# Patient Record
Sex: MALE | Race: WHITE
[De-identification: names, ages, dates, MRNs, and addresses within clinical notes are randomized per-mention and may not be internally consistent; named-entity substitution may affect disease eponyms.]

---

## 2017-04-06 ENCOUNTER — HOSPITAL ENCOUNTER (OUTPATIENT)
Dept: HOSPITAL 75 - ONC | Age: 62
LOS: 90 days | Discharge: HOME | End: 2017-07-05
Attending: INTERNAL MEDICINE
Payer: COMMERCIAL

## 2017-04-06 DIAGNOSIS — Z86.010: ICD-10-CM

## 2017-04-06 DIAGNOSIS — Z85.831: ICD-10-CM

## 2017-04-06 DIAGNOSIS — Z79.899: ICD-10-CM

## 2017-04-06 DIAGNOSIS — Z08: Primary | ICD-10-CM

## 2017-04-06 LAB
ALBUMIN SERPL-MCNC: 4.3 G/DL (ref 3.2–4.5)
ALT SERPL-CCNC: 32 U/L (ref 0–55)
ANION GAP SERPL CALC-SCNC: 8 MMOL/L (ref 5–14)
AST SERPL-CCNC: 23 U/L (ref 5–34)
BASOPHILS # BLD AUTO: 0 10^3/UL (ref 0–0.1)
BASOPHILS NFR BLD AUTO: 0 % (ref 0–10)
BILIRUB SERPL-MCNC: 1.1 MG/DL (ref 0.1–1)
BUN SERPL-MCNC: 11 MG/DL (ref 7–18)
BUN/CREAT SERPL: 11
CALCIUM SERPL-MCNC: 9.1 MG/DL (ref 8.5–10.1)
CHLORIDE SERPL-SCNC: 106 MMOL/L (ref 98–107)
CO2 SERPL-SCNC: 27 MMOL/L (ref 21–32)
CREAT SERPL-MCNC: 1.01 MG/DL (ref 0.6–1.3)
EOSINOPHIL # BLD AUTO: 0.1 10^3/UL (ref 0–0.3)
EOSINOPHIL NFR BLD AUTO: 2 % (ref 0–10)
ERYTHROCYTE [DISTWIDTH] IN BLOOD BY AUTOMATED COUNT: 14.4 % (ref 10–14.5)
GFR SERPLBLD BASED ON 1.73 SQ M-ARVRAT: > 60 ML/MIN
GLUCOSE SERPL-MCNC: 101 MG/DL (ref 70–105)
LYMPHOCYTES # BLD AUTO: 1 X 10^3 (ref 1–4)
LYMPHOCYTES NFR BLD AUTO: 23 % (ref 12–44)
MCH RBC QN AUTO: 31 PG (ref 25–34)
MCHC RBC AUTO-ENTMCNC: 35 G/DL (ref 32–36)
MCV RBC AUTO: 89 FL (ref 80–99)
MONOCYTES # BLD AUTO: 0.4 X 10^3 (ref 0–1)
MONOCYTES NFR BLD AUTO: 9 % (ref 0–12)
NEUTROPHILS # BLD AUTO: 3 X 10^3 (ref 1.8–7.8)
NEUTROPHILS NFR BLD AUTO: 66 % (ref 42–75)
PLATELET # BLD: 162 10^3/UL (ref 130–400)
PMV BLD AUTO: 8.8 FL (ref 7.4–10.4)
POTASSIUM SERPL-SCNC: 4.1 MMOL/L (ref 3.6–5)
PROT SERPL-MCNC: 6.5 G/DL (ref 6.4–8.2)
RBC # BLD AUTO: 4.6 10^6/UL (ref 4.35–5.85)
SODIUM SERPL-SCNC: 141 MMOL/L (ref 135–145)
WBC # BLD AUTO: 4.6 10^3/UL (ref 4.3–11)

## 2017-04-06 PROCEDURE — 99213 OFFICE O/P EST LOW 20 MIN: CPT

## 2017-04-06 PROCEDURE — 36415 COLL VENOUS BLD VENIPUNCTURE: CPT

## 2017-04-06 PROCEDURE — 80053 COMPREHEN METABOLIC PANEL: CPT

## 2017-04-06 PROCEDURE — 85025 COMPLETE CBC W/AUTO DIFF WBC: CPT

## 2018-04-12 ENCOUNTER — HOSPITAL ENCOUNTER (OUTPATIENT)
Dept: HOSPITAL 75 - ONC | Age: 63
LOS: 90 days | Discharge: HOME | End: 2018-07-11
Attending: INTERNAL MEDICINE
Payer: COMMERCIAL

## 2018-04-12 DIAGNOSIS — Z79.899: ICD-10-CM

## 2018-04-12 DIAGNOSIS — Z86.010: ICD-10-CM

## 2018-04-12 DIAGNOSIS — Z08: Primary | ICD-10-CM

## 2018-04-12 DIAGNOSIS — Z85.831: ICD-10-CM

## 2018-04-12 LAB
ALBUMIN SERPL-MCNC: 4.4 GM/DL (ref 3.2–4.5)
ALP SERPL-CCNC: 66 U/L (ref 40–136)
ALT SERPL-CCNC: 31 U/L (ref 0–55)
BASOPHILS # BLD AUTO: 0 10^3/UL (ref 0–0.1)
BASOPHILS NFR BLD AUTO: 1 % (ref 0–10)
BILIRUB SERPL-MCNC: 0.9 MG/DL (ref 0.1–1)
BUN/CREAT SERPL: 13
CALCIUM SERPL-MCNC: 9.3 MG/DL (ref 8.5–10.1)
CHLORIDE SERPL-SCNC: 104 MMOL/L (ref 98–107)
CO2 SERPL-SCNC: 27 MMOL/L (ref 21–32)
CREAT SERPL-MCNC: 0.95 MG/DL (ref 0.6–1.3)
EOSINOPHIL # BLD AUTO: 0.1 10^3/UL (ref 0–0.3)
EOSINOPHIL NFR BLD AUTO: 2 % (ref 0–10)
ERYTHROCYTE [DISTWIDTH] IN BLOOD BY AUTOMATED COUNT: 14.1 % (ref 10–14.5)
GFR SERPLBLD BASED ON 1.73 SQ M-ARVRAT: > 60 ML/MIN
GLUCOSE SERPL-MCNC: 96 MG/DL (ref 70–105)
HCT VFR BLD CALC: 40 % (ref 40–54)
HGB BLD-MCNC: 13.8 G/DL (ref 13.3–17.7)
LYMPHOCYTES # BLD AUTO: 1 X 10^3 (ref 1–4)
LYMPHOCYTES NFR BLD AUTO: 21 % (ref 12–44)
MANUAL DIFFERENTIAL PERFORMED BLD QL: NO
MCH RBC QN AUTO: 31 PG (ref 25–34)
MCHC RBC AUTO-ENTMCNC: 34 G/DL (ref 32–36)
MCV RBC AUTO: 91 FL (ref 80–99)
MONOCYTES # BLD AUTO: 0.4 X 10^3 (ref 0–1)
MONOCYTES NFR BLD AUTO: 9 % (ref 0–12)
NEUTROPHILS # BLD AUTO: 3.1 X 10^3 (ref 1.8–7.8)
NEUTROPHILS NFR BLD AUTO: 68 % (ref 42–75)
PLATELET # BLD: 171 10^3/UL (ref 130–400)
PMV BLD AUTO: 9.2 FL (ref 7.4–10.4)
POTASSIUM SERPL-SCNC: 4.3 MMOL/L (ref 3.6–5)
PROT SERPL-MCNC: 6.7 GM/DL (ref 6.4–8.2)
RBC # BLD AUTO: 4.43 10^6/UL (ref 4.35–5.85)
SODIUM SERPL-SCNC: 140 MMOL/L (ref 135–145)
WBC # BLD AUTO: 4.7 10^3/UL (ref 4.3–11)

## 2018-04-12 PROCEDURE — 85025 COMPLETE CBC W/AUTO DIFF WBC: CPT

## 2018-04-12 PROCEDURE — 99213 OFFICE O/P EST LOW 20 MIN: CPT

## 2018-04-12 PROCEDURE — 80053 COMPREHEN METABOLIC PANEL: CPT

## 2018-04-12 PROCEDURE — 36415 COLL VENOUS BLD VENIPUNCTURE: CPT

## 2018-04-12 PROCEDURE — 83615 LACTATE (LD) (LDH) ENZYME: CPT

## 2019-12-16 ENCOUNTER — HOSPITAL ENCOUNTER (OUTPATIENT)
Dept: HOSPITAL 75 - RAD | Age: 64
End: 2019-12-16
Attending: INTERNAL MEDICINE
Payer: COMMERCIAL

## 2019-12-16 DIAGNOSIS — M79.89: Primary | ICD-10-CM

## 2019-12-16 NOTE — DIAGNOSTIC IMAGING REPORT
PROCEDURE: US left lower extremity venous.



TECHNIQUE: Multiple real-time grayscale images were obtained over

the left lower extremity in various projections. Additional

duplex Doppler and color Doppler images were also obtained.



INDICATION: Left leg swelling.



FINDINGS:  There is no evidence of a left lower extremity DVT. A

left lower extremity deep venous system demonstrates normal

compressibility with normal response to augmentation and

Valsalva. No fluid collection or mass is seen.



IMPRESSION: No evidence of left lower extremity DVT.



Dictated by: 



  Dictated on workstation # [id number]

## 2020-01-07 ENCOUNTER — HOSPITAL ENCOUNTER (OUTPATIENT)
Dept: HOSPITAL 75 - PREOP | Age: 65
Discharge: HOME | End: 2020-01-07
Attending: SURGERY
Payer: COMMERCIAL

## 2020-01-07 VITALS — HEIGHT: 72.83 IN | WEIGHT: 220.46 LBS | BODY MASS INDEX: 29.22 KG/M2

## 2020-01-07 DIAGNOSIS — Z01.818: Primary | ICD-10-CM

## 2020-01-20 ENCOUNTER — HOSPITAL ENCOUNTER (EMERGENCY)
Dept: HOSPITAL 75 - ER | Age: 65
Discharge: HOME | End: 2020-01-20
Payer: COMMERCIAL

## 2020-01-20 VITALS — SYSTOLIC BLOOD PRESSURE: 122 MMHG | DIASTOLIC BLOOD PRESSURE: 66 MMHG

## 2020-01-20 VITALS — DIASTOLIC BLOOD PRESSURE: 73 MMHG | SYSTOLIC BLOOD PRESSURE: 105 MMHG

## 2020-01-20 VITALS — DIASTOLIC BLOOD PRESSURE: 63 MMHG | SYSTOLIC BLOOD PRESSURE: 124 MMHG

## 2020-01-20 VITALS — HEIGHT: 72.99 IN | WEIGHT: 220.46 LBS | BODY MASS INDEX: 29.22 KG/M2

## 2020-01-20 DIAGNOSIS — I95.1: ICD-10-CM

## 2020-01-20 DIAGNOSIS — Z87.891: ICD-10-CM

## 2020-01-20 DIAGNOSIS — I10: ICD-10-CM

## 2020-01-20 DIAGNOSIS — K91.840: Primary | ICD-10-CM

## 2020-01-20 DIAGNOSIS — Z85.038: ICD-10-CM

## 2020-01-20 DIAGNOSIS — Z79.82: ICD-10-CM

## 2020-01-20 DIAGNOSIS — Z77.22: ICD-10-CM

## 2020-01-20 LAB
ALBUMIN SERPL-MCNC: 4.5 GM/DL (ref 3.2–4.5)
ALP SERPL-CCNC: 65 U/L (ref 40–136)
ALT SERPL-CCNC: 19 U/L (ref 0–55)
APTT BLD: 23 SEC (ref 24–35)
BASOPHILS # BLD AUTO: 0 10^3/UL (ref 0–0.1)
BASOPHILS NFR BLD AUTO: 0 % (ref 0–10)
BILIRUB SERPL-MCNC: 0.6 MG/DL (ref 0.1–1)
BUN/CREAT SERPL: 12
CALCIUM SERPL-MCNC: 9 MG/DL (ref 8.5–10.1)
CHLORIDE SERPL-SCNC: 103 MMOL/L (ref 98–107)
CO2 SERPL-SCNC: 22 MMOL/L (ref 21–32)
CREAT SERPL-MCNC: 1.24 MG/DL (ref 0.6–1.3)
EOSINOPHIL # BLD AUTO: 0.1 10^3/UL (ref 0–0.3)
EOSINOPHIL NFR BLD AUTO: 2 % (ref 0–10)
ERYTHROCYTE [DISTWIDTH] IN BLOOD BY AUTOMATED COUNT: 13.9 % (ref 10–14.5)
GFR SERPLBLD BASED ON 1.73 SQ M-ARVRAT: 58 ML/MIN
GLUCOSE SERPL-MCNC: 113 MG/DL (ref 70–105)
HCT VFR BLD CALC: 40 % (ref 40–54)
HGB BLD-MCNC: 13.8 G/DL (ref 13.3–17.7)
INR PPP: 1 (ref 0.8–1.4)
LYMPHOCYTES # BLD AUTO: 1.5 X 10^3 (ref 1–4)
LYMPHOCYTES NFR BLD AUTO: 28 % (ref 12–44)
MANUAL DIFFERENTIAL PERFORMED BLD QL: NO
MCH RBC QN AUTO: 31 PG (ref 25–34)
MCHC RBC AUTO-ENTMCNC: 34 G/DL (ref 32–36)
MCV RBC AUTO: 89 FL (ref 80–99)
MONOCYTES # BLD AUTO: 0.5 X 10^3 (ref 0–1)
MONOCYTES NFR BLD AUTO: 9 % (ref 0–12)
NEUTROPHILS # BLD AUTO: 3.4 X 10^3 (ref 1.8–7.8)
NEUTROPHILS NFR BLD AUTO: 61 % (ref 42–75)
PLATELET # BLD: 181 10^3/UL (ref 130–400)
PMV BLD AUTO: 9.5 FL (ref 7.4–10.4)
POTASSIUM SERPL-SCNC: 3.7 MMOL/L (ref 3.6–5)
PROT SERPL-MCNC: 6.9 GM/DL (ref 6.4–8.2)
PROTHROMBIN TIME: 13.6 SEC (ref 12.2–14.7)
SODIUM SERPL-SCNC: 138 MMOL/L (ref 135–145)
WBC # BLD AUTO: 5.5 10^3/UL (ref 4.3–11)

## 2020-01-20 PROCEDURE — 86900 BLOOD TYPING SEROLOGIC ABO: CPT

## 2020-01-20 PROCEDURE — 85610 PROTHROMBIN TIME: CPT

## 2020-01-20 PROCEDURE — 36415 COLL VENOUS BLD VENIPUNCTURE: CPT

## 2020-01-20 PROCEDURE — 74022 RADEX COMPL AQT ABD SERIES: CPT

## 2020-01-20 PROCEDURE — 85025 COMPLETE CBC W/AUTO DIFF WBC: CPT

## 2020-01-20 PROCEDURE — 86901 BLOOD TYPING SEROLOGIC RH(D): CPT

## 2020-01-20 PROCEDURE — 96361 HYDRATE IV INFUSION ADD-ON: CPT

## 2020-01-20 PROCEDURE — 85730 THROMBOPLASTIN TIME PARTIAL: CPT

## 2020-01-20 PROCEDURE — 86850 RBC ANTIBODY SCREEN: CPT

## 2020-01-20 PROCEDURE — 96360 HYDRATION IV INFUSION INIT: CPT

## 2020-01-20 PROCEDURE — 80053 COMPREHEN METABOLIC PANEL: CPT

## 2020-01-20 NOTE — DIAGNOSTIC IMAGING REPORT
INDICATION: Abdominal pain after recent colonoscopy.



COMPARISON: None available.



FINDINGS: No free intraperitoneal air. Nonobstructive bowel gas

pattern. Air-fluid levels within the colon may relate to a

diarrheal state. Lungs are clear. No pleural effusion or

pneumothorax. Normal cardiomediastinal silhouette.



IMPRESSION:

1. No free intraperitoneal air to indicate colonic perforation.



Dictated by: 



  Dictated on workstation # [id number]

## 2020-01-20 NOTE — ED GI
General


Chief Complaint:  Abdominal/GI Problems


Stated Complaint:  DIZZINESS


Nursing Triage Note:  


Pt to ED via EMS.  Pt reports having a colonoscopy performed by Dr. Tolbert last 


Tuesday.  Pt reports no complications until today.  Pt went to restroom and 


thought was going to have a bowel movement.  Toilet was then filled with blood. 




Pt went back to desk and became very weak.  Pt called boss and stated, "I am 


going down."  Pt's boss called EMS.


Sepsis Screen:  No Definite Risk


Source of Information:  Patient, Spouse


Exam Limitations:  No Limitations





History of Present Illness


Date Seen by Provider:  2020


Time Seen by Provider:  17:57


Initial Comments


Patient presents to ER by EMS from work with chief complaint that he had some 

rumbling in his abdomen and went to the bathroom and had a large red bloody 

stool. He says it was loose. He had an normal bowel movement earlier today that 

was unremarkable. He had colonoscopy 6 days ago by Dr. Tolbert for polyp 

removals. He's had a history of partial colectomy secondary to diverticulitis in

the past as well as multiple endoscopies for polyps. He denies a history of FAP.

This is the first time she's ever had bleeding after a colonoscopy. He is not 

having any abdominal discomfort now. He says he got worried about this started 

feeling lightheaded like he does not pass out so he called his boss to call 

Lyndsay Gil for them because as they were walking out of his office he had to s

it down because he felt like his about to faint. He has no history of passing 

out. He is on 81 mg aspirin daily but no blood thinners or other antiplatelets. 

He does take lisinopril and diltiazem.





Allergies and Home Medications


Allergies


Coded Allergies:  


     No Known Drug Allergies (Unverified , 20)





Home Medications


Aspirin 81 Mg Tab.chew, 81 MG PO DAILY, (Reported)


Diltiazem HCl 240 Mg Cap.er.24h, 240 MG PO DAILY, (Reported)


Lisinopril 10 Mg Tablet, 10 MG PO DAILY, (Reported)


Tamsulosin HCl 0.4 Mg Cap, 0.4 MG PO DAILY, (Reported)





Patient Home Medication List


Home Medication List Reviewed:  Yes





Review of Systems


Review of Systems


Constitutional:  No chills, No fever, No malaise


EENTM:  No Blurred Vision, No Double Vision


Respiratory:  Denies Cough, Denies Shortness of Air


Cardiovascular:  Denies Chest Pain, Denies Lightheadedness


Gastrointestinal:  See HPI; Denies Constipated; Diarrhea; Denies Nausea


Genitourinary:  Denies Burning, Denies Discharge


Musculoskeletal:  No back pain, No joint pain





All Other Systems Reviewed


Negative Unless Noted:  Yes





Past Medical-Social-Family Hx


Patient Social History


Alcohol Use:  Denies Use


Recreational Drug Use:  No


Smoking Status:  Never a Smoker


Former Smoker, Quit:  1978


2nd Hand Smoke Exposure:  Yes


Recent Foreign Travel:  No


Contact w/Someone Who Travel:  No


Recent Infectious Disease Expo:  No


Recent Hopitalizations:  No





Immunizations Up To Date


Tetanus Booster (TDap):  More than 5yrs


PED Vaccines UTD:  No





Seasonal Allergies


Seasonal Allergies:  No





Past Medical History


Surgeries:  Yes (partial COLON RESECTION FOR CANCEROUS POLYP, LUMP FROM LEG)


Abdominal


Respiratory:  No


Cardiac:  Yes (recent stress test-palpations)


Hypertension, Irregular Heartbeat


Neurological:  No


Reproductive Disorders:  No


Sexually Transmitted Disease:  No


HIV/AIDS:  No


Genitourinary:  No


Gastrointestinal:  Yes (HX OF POLYPS, COLON CANCER)


Polyps


Musculoskeletal:  No


Endocrine:  No


HEENT:  No


Loss of Vision:  Denies


Hearing Impairment:  Denies


Cancer:  Yes


Colon


Did You Recieve Any Treatments:  Yes


What Type of Treatment Did You:  Surgical Intervention


Psychosocial:  No


Integumentary:  No


Blood Disorders:  No


Adverse Reaction/Blood Tranf:  No (N/A)





Family Medical History


No Pertinent Family Hx





Physical Exam


Vital Signs





Vital Signs - First Documented








 20





 17:23


 


Temp 36.9


 


Pulse 64


 


Resp 15


 


B/P (MAP) 94/52 (66)


 


Pulse Ox 98


 


O2 Delivery Room Air





Capillary Refill : Less Than 3 Seconds


Height/Weight/BMI


Height: 6'1.00"


Weight: 213lbs. 0.0oz. [id number]; 29.00 BMI


Method:Stated


General Appearance:  WD/WN, no apparent distress


HEENT:  PERRL/EOMI, pharynx normal


Neck:  full range of motion, normal inspection


Respiratory:  no respiratory distress, no accessory muscle use


Cardiovascular:  normal peripheral pulses, regular rate, rhythm


Gastrointestinal:  normal bowel sounds, non tender, soft, no organomegaly, other

(Negative for mesenteric signs)


Neurologic/Psychiatric:  alert, normal mood/affect, oriented x 3


Skin:  normal color, warm/dry





Progress/Results/Core Measures


Results/Orders


Lab Results





Laboratory Tests








Test


 20


17:25 Range/Units


 


 


White Blood Count


 5.5 


 4.3-11.0


10^3/uL


 


Red Blood Count


 4.51 


 4.35-5.85


10^6/uL


 


Hemoglobin 13.8  13.3-17.7  G/DL


 


Hematocrit 40  40-54  %


 


Mean Corpuscular Volume 89  80-99  FL


 


Mean Corpuscular Hemoglobin 31  25-34  PG


 


Mean Corpuscular Hemoglobin


Concent 34 


 32-36  G/DL





 


Red Cell Distribution Width 13.9  10.0-14.5  %


 


Platelet Count


 181 


 130-400


10^3/uL


 


Mean Platelet Volume 9.5  7.4-10.4  FL


 


Neutrophils (%) (Auto) 61  42-75  %


 


Lymphocytes (%) (Auto) 28  12-44  %


 


Monocytes (%) (Auto) 9  0-12  %


 


Eosinophils (%) (Auto) 2  0-10  %


 


Basophils (%) (Auto) 0  0-10  %


 


Neutrophils # (Auto) 3.4  1.8-7.8  X 10^3


 


Lymphocytes # (Auto) 1.5  1.0-4.0  X 10^3


 


Monocytes # (Auto) 0.5  0.0-1.0  X 10^3


 


Eosinophils # (Auto)


 0.1 


 0.0-0.3


10^3/uL


 


Basophils # (Auto)


 0.0 


 0.0-0.1


10^3/uL


 


Prothrombin Time 13.6  12.2-14.7  SEC


 


INR Comment 1.0  0.8-1.4  


 


Activated Partial


Thromboplast Time 23 L


 24-35  SEC





 


Sodium Level 138  135-145  MMOL/L


 


Potassium Level 3.7  3.6-5.0  MMOL/L


 


Chloride Level 103    MMOL/L


 


Carbon Dioxide Level 22  21-32  MMOL/L


 


Anion Gap 13  5-14  MMOL/L


 


Blood Urea Nitrogen 15  7-18  MG/DL


 


Creatinine


 1.24 


 0.60-1.30


MG/DL


 


Estimat Glomerular Filtration


Rate 58 


  





 


BUN/Creatinine Ratio 12   


 


Glucose Level 113 H   MG/DL


 


Calcium Level 9.0  8.5-10.1  MG/DL


 


Corrected Calcium 8.6  8.5-10.1  MG/DL


 


Total Bilirubin 0.6  0.1-1.0  MG/DL


 


Aspartate Amino Transf


(AST/SGOT) 15 


 5-34  U/L





 


Alanine Aminotransferase


(ALT/SGPT) 19 


 0-55  U/L





 


Alkaline Phosphatase 65    U/L


 


Total Protein 6.9  6.4-8.2  GM/DL


 


Albumin 4.5  3.2-4.5  GM/DL








My Orders





Orders - ULISSES HERNANDEZ


Orthostatic Vital Signs (Adult (20 18:07)


Acute Abd Series (20 18:11)


Ed Iv/Invasive Line Start (20 18:17)


Ns Iv 1000 Ml (Sodium Chloride 0.9%) (20 18:17)





Vital Signs/I&O











 20





 17:23 18:13 21:02


 


Temp 36.9  


 


Pulse 64 56 51





  60 54





  65 63


 


Resp 15  


 


B/P (MAP) 94/52 (66) 124/63 (83) 135/70 (91)





  122/60 (80) 122/68 (86)





  104/61 (75) 109/66 (80)


 


Pulse Ox 98  


 


O2 Delivery Room Air  














Blood Pressure Mean:                    66











Progress


Progress Note #1:  


   Time:  18:14


Progress Note


Suspect he's had some GI discomfort related to a slow bleed after polypectomy. 

We'll get a set of plain films to rule out any free air in the abdomen. He's not

requiring anything for pain. His orthostatics are positive so we'll let him get 

two liters of fluid and recheck him.


Progress Note #2:  


   Time:  21:02


Progress Note


Repeat orthostatics are positive dropping a systolic 135 down to 109 however he 

is no longer hypotensive and asymptomatic. We discussed more fluids versus 

observation stay versus going home and pushing oral fluids. He has elected to do

the latter.





Diagnostic Imaging





   Diagonstic Imaging:  Xray


   Plain Films/CT/US/NM/MRI:  abdomen


Comments


NAME:   RODRIGO REYNOSO


MED REC#:   [id number]


ACCOUNT#:   [id number]


PT STATUS:   REG ER


:   1955


PHYSICIAN:   ULISSES HERNANDEZ MD


ADMIT DATE:   20/ER


                                   ***Draft***


Date of Exam:01/20/20





ACUTE ABD SERIES





INDICATION: Abdominal pain after recent colonoscopy.





COMPARISON: None available.





FINDINGS: No free intraperitoneal air. Nonobstructive bowel gas


pattern. Air-fluid levels within the colon may relate to a


diarrheal state. Lungs are clear. No pleural effusion or


pneumothorax. Normal cardiomediastinal silhouette.





IMPRESSION:


1. No free intraperitoneal air to indicate colonic perforation.





  Dictated on workstation # [id number]





Dict:   20


Trans:   20


[facility] [phone]





Interpreted by:     MATTIE CARLSON MD


Electronically signed by:


   Reviewed:  Reviewed by Me


Consults :  


   Consulting Physician:  SEYMOUR TOLBERT DO


Consults Notes


Unless bloody stools become a pattern in which case we could put him in the 

hospital he would recommend a liquid diet for a couple days until stools care up

and keep his follow-up appointments.





Departure


Impression





   Primary Impression:  


   Post-polypectomy bleeding


   Additional Impression:  


   Orthostatic hypotension


Disposition:   HOME, SELF-CARE


Condition:  Stable





Departure-Patient Inst.


Decision time for Depature:  20:21


Referrals:  


SUMI DUEÑAS MD (PCP/Family)


Primary Care Physician


Patient Instructions:  Colon Polyps (DC), Gastrointestinal Bleeding





Add. Discharge Instructions:  


I suspect the bleeding is related to the recent polypectomies. It should be a 

limited of air however if it persists then he would be reasonable to come back 

to the hospital for a overnight stay and recheck of labs and have Dr. Tolbert see

you about possibly repeat performing a colonoscopy. You can call your primary 

care or the surgeons office to set this up or return to the ER.


Stick to a liquid diet for the next 2-3 days until all your symptoms are gone.


Expect blood-tinged stool for the next couple days.


Do not use Imodium or Pepto-Bismol.





All discharge instructions reviewed with patient and/or family. Voiced 

understanding.





Copy


Copies To 1:   SUMI DUEÑAS MD, TITUS J                 2020 18:10

## 2020-12-22 ENCOUNTER — HOSPITAL ENCOUNTER (OUTPATIENT)
Dept: HOSPITAL 75 - CARD | Age: 65
End: 2020-12-22
Attending: NURSE PRACTITIONER
Payer: COMMERCIAL

## 2020-12-22 DIAGNOSIS — I51.7: Primary | ICD-10-CM

## 2020-12-22 DIAGNOSIS — I35.1: ICD-10-CM

## 2020-12-22 PROCEDURE — 93306 TTE W/DOPPLER COMPLETE: CPT

## 2021-02-08 ENCOUNTER — HOSPITAL ENCOUNTER (INPATIENT)
Dept: HOSPITAL 75 - ER | Age: 66
LOS: 3 days | Discharge: HOME | DRG: 390 | End: 2021-02-11
Attending: SURGERY | Admitting: SURGERY
Payer: COMMERCIAL

## 2021-02-08 VITALS — HEIGHT: 72.99 IN | BODY MASS INDEX: 30.5 KG/M2 | WEIGHT: 230.16 LBS

## 2021-02-08 DIAGNOSIS — I48.0: ICD-10-CM

## 2021-02-08 DIAGNOSIS — R35.1: ICD-10-CM

## 2021-02-08 DIAGNOSIS — K56.600: Primary | ICD-10-CM

## 2021-02-08 DIAGNOSIS — K57.90: ICD-10-CM

## 2021-02-08 DIAGNOSIS — N40.1: ICD-10-CM

## 2021-02-08 DIAGNOSIS — R11.2: ICD-10-CM

## 2021-02-08 DIAGNOSIS — I10: ICD-10-CM

## 2021-02-08 DIAGNOSIS — Z87.891: ICD-10-CM

## 2021-02-08 DIAGNOSIS — Z79.82: ICD-10-CM

## 2021-02-08 LAB
BASOPHILS # BLD AUTO: 0 10^3/UL (ref 0–0.1)
BASOPHILS NFR BLD AUTO: 0 % (ref 0–10)
EOSINOPHIL # BLD AUTO: 0.1 10^3/UL (ref 0–0.3)
EOSINOPHIL NFR BLD AUTO: 1 % (ref 0–10)
HCT VFR BLD CALC: 42 % (ref 40–54)
HGB BLD-MCNC: 14 G/DL (ref 13.3–17.7)
LYMPHOCYTES # BLD AUTO: 0.9 10^3/UL (ref 1–4)
LYMPHOCYTES NFR BLD AUTO: 10 % (ref 12–44)
MANUAL DIFFERENTIAL PERFORMED BLD QL: NO
MCH RBC QN AUTO: 30 PG (ref 25–34)
MCHC RBC AUTO-ENTMCNC: 33 G/DL (ref 32–36)
MCV RBC AUTO: 90 FL (ref 80–99)
MONOCYTES # BLD AUTO: 0.6 10^3/UL (ref 0–1)
MONOCYTES NFR BLD AUTO: 6 % (ref 0–12)
NEUTROPHILS # BLD AUTO: 7.3 10^3/UL (ref 1.8–7.8)
NEUTROPHILS NFR BLD AUTO: 82 % (ref 42–75)
PLATELET # BLD: 183 10^3/UL (ref 130–400)
PMV BLD AUTO: 9.3 FL (ref 9–12.2)
WBC # BLD AUTO: 8.8 10^3/UL (ref 4.3–11)

## 2021-02-08 PROCEDURE — 74018 RADEX ABDOMEN 1 VIEW: CPT

## 2021-02-08 PROCEDURE — 93041 RHYTHM ECG TRACING: CPT

## 2021-02-08 PROCEDURE — 81000 URINALYSIS NONAUTO W/SCOPE: CPT

## 2021-02-08 PROCEDURE — 83690 ASSAY OF LIPASE: CPT

## 2021-02-08 PROCEDURE — 74250 X-RAY XM SM INT 1CNTRST STD: CPT

## 2021-02-08 PROCEDURE — 96374 THER/PROPH/DIAG INJ IV PUSH: CPT

## 2021-02-08 PROCEDURE — 36415 COLL VENOUS BLD VENIPUNCTURE: CPT

## 2021-02-08 PROCEDURE — 82150 ASSAY OF AMYLASE: CPT

## 2021-02-08 PROCEDURE — 74022 RADEX COMPL AQT ABD SERIES: CPT

## 2021-02-08 PROCEDURE — 96361 HYDRATE IV INFUSION ADD-ON: CPT

## 2021-02-08 PROCEDURE — 83735 ASSAY OF MAGNESIUM: CPT

## 2021-02-08 PROCEDURE — 85025 COMPLETE CBC W/AUTO DIFF WBC: CPT

## 2021-02-08 PROCEDURE — 74176 CT ABD & PELVIS W/O CONTRAST: CPT

## 2021-02-08 PROCEDURE — 80053 COMPREHEN METABOLIC PANEL: CPT

## 2021-02-08 PROCEDURE — 71045 X-RAY EXAM CHEST 1 VIEW: CPT

## 2021-02-09 VITALS — DIASTOLIC BLOOD PRESSURE: 83 MMHG | SYSTOLIC BLOOD PRESSURE: 146 MMHG

## 2021-02-09 VITALS — DIASTOLIC BLOOD PRESSURE: 74 MMHG | SYSTOLIC BLOOD PRESSURE: 126 MMHG

## 2021-02-09 VITALS — SYSTOLIC BLOOD PRESSURE: 140 MMHG | DIASTOLIC BLOOD PRESSURE: 73 MMHG

## 2021-02-09 VITALS — SYSTOLIC BLOOD PRESSURE: 121 MMHG | DIASTOLIC BLOOD PRESSURE: 72 MMHG

## 2021-02-09 VITALS — SYSTOLIC BLOOD PRESSURE: 124 MMHG | DIASTOLIC BLOOD PRESSURE: 61 MMHG

## 2021-02-09 LAB
ALBUMIN SERPL-MCNC: 3.9 GM/DL (ref 3.2–4.5)
ALBUMIN SERPL-MCNC: 4.4 GM/DL (ref 3.2–4.5)
ALP SERPL-CCNC: 60 U/L (ref 40–136)
ALP SERPL-CCNC: 66 U/L (ref 40–136)
ALT SERPL-CCNC: 24 U/L (ref 0–55)
ALT SERPL-CCNC: 26 U/L (ref 0–55)
AMYLASE SERPL-CCNC: 53 U/L (ref 25–125)
APTT PPP: YELLOW S
BACTERIA #/AREA URNS HPF: NEGATIVE /HPF
BASOPHILS # BLD AUTO: 0 10^3/UL (ref 0–0.1)
BASOPHILS NFR BLD AUTO: 0 % (ref 0–10)
BILIRUB SERPL-MCNC: 0.5 MG/DL (ref 0.1–1)
BILIRUB SERPL-MCNC: 0.6 MG/DL (ref 0.1–1)
BILIRUB UR QL STRIP: NEGATIVE
BUN/CREAT SERPL: 12
BUN/CREAT SERPL: 14
CALCIUM SERPL-MCNC: 8.6 MG/DL (ref 8.5–10.1)
CALCIUM SERPL-MCNC: 9.1 MG/DL (ref 8.5–10.1)
CHLORIDE SERPL-SCNC: 103 MMOL/L (ref 98–107)
CHLORIDE SERPL-SCNC: 104 MMOL/L (ref 98–107)
CO2 SERPL-SCNC: 24 MMOL/L (ref 21–32)
CO2 SERPL-SCNC: 26 MMOL/L (ref 21–32)
CREAT SERPL-MCNC: 0.98 MG/DL (ref 0.6–1.3)
CREAT SERPL-MCNC: 1.19 MG/DL (ref 0.6–1.3)
EOSINOPHIL # BLD AUTO: 0 10^3/UL (ref 0–0.3)
EOSINOPHIL NFR BLD AUTO: 0 % (ref 0–10)
FIBRINOGEN PPP-MCNC: CLEAR MG/DL
GFR SERPLBLD BASED ON 1.73 SQ M-ARVRAT: > 60 ML/MIN
GFR SERPLBLD BASED ON 1.73 SQ M-ARVRAT: > 60 ML/MIN
GLUCOSE SERPL-MCNC: 113 MG/DL (ref 70–105)
GLUCOSE SERPL-MCNC: 134 MG/DL (ref 70–105)
GLUCOSE UR STRIP-MCNC: NEGATIVE MG/DL
HCT VFR BLD CALC: 39 % (ref 40–54)
HGB BLD-MCNC: 13.2 G/DL (ref 13.3–17.7)
KETONES UR QL STRIP: NEGATIVE
LEUKOCYTE ESTERASE UR QL STRIP: NEGATIVE
LIPASE SERPL-CCNC: 20 U/L (ref 8–78)
LYMPHOCYTES # BLD AUTO: 0.7 10^3/UL (ref 1–4)
LYMPHOCYTES NFR BLD AUTO: 8 % (ref 12–44)
MAGNESIUM SERPL-MCNC: 1.8 MG/DL (ref 1.6–2.4)
MANUAL DIFFERENTIAL PERFORMED BLD QL: NO
MCH RBC QN AUTO: 30 PG (ref 25–34)
MCHC RBC AUTO-ENTMCNC: 34 G/DL (ref 32–36)
MCV RBC AUTO: 90 FL (ref 80–99)
MONOCYTES # BLD AUTO: 0.5 10^3/UL (ref 0–1)
MONOCYTES NFR BLD AUTO: 6 % (ref 0–12)
NEUTROPHILS # BLD AUTO: 7.6 10^3/UL (ref 1.8–7.8)
NEUTROPHILS NFR BLD AUTO: 86 % (ref 42–75)
NITRITE UR QL STRIP: NEGATIVE
PH UR STRIP: 7 [PH] (ref 5–9)
PLATELET # BLD: 199 10^3/UL (ref 130–400)
PMV BLD AUTO: 10 FL (ref 9–12.2)
POTASSIUM SERPL-SCNC: 3.6 MMOL/L (ref 3.6–5)
POTASSIUM SERPL-SCNC: 4 MMOL/L (ref 3.6–5)
PROT SERPL-MCNC: 5.9 GM/DL (ref 6.4–8.2)
PROT SERPL-MCNC: 6.9 GM/DL (ref 6.4–8.2)
PROT UR QL STRIP: (no result)
RBC #/AREA URNS HPF: (no result) /HPF
SODIUM SERPL-SCNC: 139 MMOL/L (ref 135–145)
SODIUM SERPL-SCNC: 141 MMOL/L (ref 135–145)
SP GR UR STRIP: 1.02 (ref 1.02–1.02)
SQUAMOUS #/AREA URNS HPF: (no result) /HPF
WBC # BLD AUTO: 8.9 10^3/UL (ref 4.3–11)
WBC #/AREA URNS HPF: (no result) /HPF

## 2021-02-09 RX ADMIN — ONDANSETRON PRN MG: 2 INJECTION, SOLUTION INTRAMUSCULAR; INTRAVENOUS at 05:48

## 2021-02-09 RX ADMIN — PANTOPRAZOLE SODIUM SCH MG: 40 INJECTION, POWDER, FOR SOLUTION INTRAVENOUS at 07:56

## 2021-02-09 RX ADMIN — DEXTROSE MONOHYDRATE, SODIUM CHLORIDE, AND POTASSIUM CHLORIDE SCH MLS/HR: 50; 4.5; 1.49 INJECTION, SOLUTION INTRAVENOUS at 04:16

## 2021-02-09 RX ADMIN — DEXTROSE MONOHYDRATE, SODIUM CHLORIDE, AND POTASSIUM CHLORIDE SCH MLS/HR: 50; 4.5; 1.49 INJECTION, SOLUTION INTRAVENOUS at 22:06

## 2021-02-09 RX ADMIN — ONDANSETRON PRN MG: 2 INJECTION, SOLUTION INTRAMUSCULAR; INTRAVENOUS at 07:57

## 2021-02-09 RX ADMIN — DEXTROSE MONOHYDRATE, SODIUM CHLORIDE, AND POTASSIUM CHLORIDE SCH MLS/HR: 50; 4.5; 1.49 INJECTION, SOLUTION INTRAVENOUS at 11:31

## 2021-02-09 RX ADMIN — DEXTROSE MONOHYDRATE, SODIUM CHLORIDE, AND POTASSIUM CHLORIDE SCH MLS/HR: 50; 4.5; 1.49 INJECTION, SOLUTION INTRAVENOUS at 11:40

## 2021-02-09 NOTE — DIAGNOSTIC IMAGING REPORT
INDICATION: Small bowel obstruction.



COMPARISON: Imaging from the same date and from February 08, 2021.



TECHNIQUE: Single-contrast small bowel follow-through was

performed on February 09, 2021.



FINDINGS:  imaging demonstrates enteric catheter in place

with the distal tip and sidehole within the body of the stomach.

The stomach is dilated. Loops of small bowel within the left

abdomen are at the upper limits of normal in size measuring just

under 3 cm. No free air. Chain suture is noted within the midline

pelvis.



Contrast was instilled into the stomach via enteric catheter.

Contrast is identified within the colon at one hour. No free

intraperitoneal contrast. No acute osseous abnormality.



IMPRESSION: Small bowel transit time of near one hour is noted.

Therefore, there is no evidence of high-grade bowel obstruction.



Dilated stomach with borderline dilated small bowel. Findings are

favored to relate to ileus. Low-grade bowel obstruction is felt

less likely given normal small bowel transit time.



Postsurgical changes associated with the colon with moderate

amount of stool throughout the colon.



Dictated by: 



  Dictated on workstation # [id number]

## 2021-02-09 NOTE — DIAGNOSTIC IMAGING REPORT
INDICATION: Pain 



FINDINGS:

No focal pulmonary consolidation. No failure, effusion or

pneumothorax. There is mild air-containing small bowel ectasia in

the mid abdomen and upper pelvis. There is  some stool in the

colon. The colonic fecal load not grossly pathologic. In upright

views there is some left flank scattered air-fluid levels.

Overall the magnitude of small bowel dilatation and small bowel

fecalization is at least somewhat improved from the CT of one day

earlier.



IMPRESSION:

Small bowel dilatation and small bowel fecalization likely at

least mildly improved from a earlier CT of the chest is nonacute.

There is no free air.



Dictated by: 



  Dictated on workstation # [id number]

## 2021-02-09 NOTE — HISTORY & PHYSICAL-SURGICAL
EDELMIRA VÁSQUEZ MED STUDENT 2/9/21 0836:


History of Present Illness


History of Present Illness


Reason for visit/HPI


Pt is a 66y/o M with PMH of BPH, Afib, and HTN who presented to ED last night 

with acute abd pain and abd distension. The abd pain was decribed as 10/10 last 

night and intermittent- the pt described the pain as diffuse and sharp. The pain

originally started a couple hrs after eating a large dinner last night, states 

he felt the urge to pass stool and managed to pass stool- described as normal 

caliber without melena or hematochezia. This morning while visiting the abd pain

is 1/10 and the pt is experiencing N/Ving. The pt did not have any N/Ving until 

now- I visualized the pts emesis- it was without blood. Pt has a PSH of sigmoid 

resection in 2003- pt states its 2/2 a suspiscious polyp on a colonoscopy. Pts 

most recent colonscopy was with Dr. Tolbert is early this year, three polyps were

removed, pathology showed 3 tubular adenomas. ROS was negative for dysphagia, 

chest pain, palpatations, hematochezia, melena, or fever. Pt denies ever having 

these sxs before. Pt last passed gas at 06:00 this morning, no stool.


Date of Admission


Feb 9, 2021 at 00:50


Date Seen by a Provider:  Feb 9, 2021


Time Seen by a Provider:  07:35


I consulted on this patient on


2/9/21


 08:20


Attending Physician


Seymour Tolbert DO


Admitting Physician


Antione Park MD


Consult








Allergies and Home Medications


Allergies


Coded Allergies:  


     No Known Drug Allergies (Unverified , 1/7/20)





Home Medications


Aspirin 81 Mg Tab.chew, 81 MG PO DAILY, (Reported)


Diltiazem HCl 240 Mg Cap.er.24h, 240 MG PO DAILY, (Reported)


Lisinopril 10 Mg Tablet, 10 MG PO DAILY, (Reported)


Tamsulosin HCl 0.4 Mg Cap, 0.4 MG PO DAILY, (Reported)





Past Medical-Social-Family Hx


Patient Social History


Drug of Choice:  DENIES


Smoking Status:  Former Smoker (4-5 Pack year hx )


Former Smoker, Quit:  Apr 25, 1978


2nd Hand Smoke Exposure:  Yes


Recent Hopitalizations:  No


Alcohol Use?:  No


Have you traveled recently?:  No





Immunizations Up To Date


Tetanus Booster (TDap):  More than 5yrs


PED Vaccines UTD:  No





Seasonal Allergies


Seasonal Allergies:  No





Surgeries


History of Surgeries:  Yes (SEE BELOW)


Surgeries:  Abdominal (lower anterior sigmoid resection in 2003 )





Respiratory


History of Respiratory Disorde:  No





Cardiovascular


History of Cardiac Disorders:  Yes (A FIB--NO ANTICOAGULATION / 81 MG ASPIRIN 

ONLY. CHRONIC L LEG SWELLING )


Cardiac Disorders:  Atrial Fibrillation, Chronic Edema/Swelling, Hypertension, 

Irregular Heartbeat





Neurological


History of Neurological Disord:  No





Reproductive System


Hx Reproductive Disorders:  No


Sexually Transmitted Disease:  No


HIV/AIDS:  No





Genitourinary


History of Genitourinary Disor:  Yes


Genitourinary Disorders:  Prostate Problems





Gastrointestinal


History of Gastrointestinal Di:  Yes (CANCEROUS VS "PRECANCEROUS" COLON POLYPS-

S/P COLON RESECTION 2003)


Gastrointestinal Disorders:  Diverticulosis, Polyps





Musculoskeletal


History of Musculoskeletal Dis:  Yes (FIBROSARCOMA LEFT INGUINAL AREA REMOVED 

06/1993-CHRONIC L LEG SWELLING)





Endocrine


History of Endocrine Disorders:  No





HEENT


History of HEENT Disorders:  No


Loss of Vision:  Denies


Hearing Impairment:  Denies





Cancer


History of Cancer:  Yes ("PRECANCEROUS COLON POLYPS" PER PT)


Cancer:  Colon





Psychosocial


History of Psychiatric Problem:  No





Integumentary


History of Skin or Integumenta:  No





Blood Transfusions


History of Blood Disorders:  No


Adverse Reaction to a Blood Tr:  No (N/A)





Family Medical History


Significant Family History:  Other Conditions/Hx (Pts father had prostate 

cancer, no other FH of cancer. )





Review of Systems


Constitutional:  No diaphoresis, No fever


EENTM:  No throat pain, No throat swelling


Respiratory:  No cough, No dyspnea on exertion, No short of breath


Cardiovascular:  No chest pain, No palpitations


Gastrointestinal:  No abdominal pain, No constipation, No diarrhea, No dysphagia


Genitourinary:  No dysuria; hesitancy (BPH hx )


Musculoskeletal:  No muscle pain, No muscle stiffness


Skin:  No change in color, No change in hair/nails


Psychiatric/Neurological:  Denies Paresthesia, Denies Tremors





Physical Exam


Vital Signs





Vital Signs - First Documented








 2/8/21





 23:28


 


Temp 36.4


 


Pulse 82


 


Resp 18


 


B/P (MAP) 152/80 (104)


 


Pulse Ox 97


 


O2 Delivery Room Air





Capillary Refill : Less Than 3 Seconds


Height, Weight, BMI


Height: 6'1.00"


Weight: 213lbs. 0.0oz. 96.823109sz; 30.37 BMI


Method:Stated


General Appearance:  WD/WN, Mild Distress (due to nausea)


HEENT:  PERRL/EOMI, Moist Mucous Membranes


Neck:  Full Range of Motion, Normal Inspection, Non Tender


Respiratory:  Chest Non Tender, Lungs Clear, Normal Breath Sounds, No Accessory 

Muscle Use, No Respiratory Distress


Cardiovascular:  No Murmur, Irregularly Irregular (afib hx)


Gastrointestinal:  Normal Bowel Sounds, No Organomegaly; No Distended, No 

Guarding; Tenderness (diffuse)


Rectal:  Deferred


Back:  Normal Inspection, No CVA Tenderness, No Vertebral Tenderness


Extremity:  Normal Capillary Refill, Normal Inspection, Non Tender, No Calf 

Tenderness, Swelling (unilateral- Left side is 3+)


Neurologic/Psychiatric:  Alert, Oriented x3, No Motor/Sensory Deficits, Normal 

Mood/Affect, CNs II-XII Norm as Tested


Skin:  Normal Color, Warm/Dry


Lymphatic:  No Adenopathy





Data Review


Labs


Laboratory Tests


2/8/21 23:40: 


White Blood Count 8.8, Red Blood Count 4.66, Hemoglobin 14.0, Hematocrit 42, 

Mean Corpuscular Volume 90, Mean Corpuscular Hemoglobin 30, Mean Corpuscular 

Hemoglobin Concent 33, Red Cell Distribution Width 13.6, Platelet Count 183, 

Mean Platelet Volume 9.3, Immature Granulocyte % (Auto) 1, Neutrophils (%) 

(Auto) 82H, Lymphocytes (%) (Auto) 10L, Monocytes (%) (Auto) 6, Eosinophils (%) 

(Auto) 1, Basophils (%) (Auto) 0, Neutrophils # (Auto) 7.3, Lymphocytes # (Auto)

0.9L, Monocytes # (Auto) 0.6, Eosinophils # (Auto) 0.1, Basophils # (Auto) 0.0, 

Immature Granulocyte # (Auto) 0.0, Sodium Level 141, Potassium Level 3.6, 

Chloride Level 104, Carbon Dioxide Level 26, Anion Gap 11, Blood Urea Nitrogen 

14, Creatinine 1.19, Estimat Glomerular Filtration Rate > 60, BUN/Creatinine 

Ratio 12, Glucose Level 134H, Calcium Level 9.1, Corrected Calcium 8.8, 

Magnesium Level 1.8, Total Bilirubin 0.5, Aspartate Amino Transf (AST/SGOT) 18, 

Alanine Aminotransferase (ALT/SGPT) 26, Alkaline Phosphatase 66, Total Protein 

6.9, Albumin 4.4, Amylase Level 53, Lipase 20


2/9/21 03:40: 


Urine Color YELLOW, Urine Clarity CLEAR, Urine pH 7.0, Urine Specific Gravity 

1.020, Urine Protein TRACEH, Urine Glucose (UA) NEGATIVE, Urine Ketones 

NEGATIVE, Urine Nitrite NEGATIVE, Urine Bilirubin NEGATIVE, Urine Urobilinogen 

0.2, Urine Leukocyte Esterase NEGATIVE, Urine RBC (Auto) NEGATIVE, Urine RBC 

NONE, Urine WBC NONE, Urine Squamous Epithelial Cells 0-2, Urine Crystals NONE, 

Urine Bacteria NEGATIVE, Urine Casts NONE, Urine Mucus LARGEH, Urine Culture 

Indicated NO


2/9/21 04:18: 


White Blood Count 8.9, Red Blood Count 4.39, Hemoglobin 13.2L, Hematocrit 39L, 

Mean Corpuscular Volume 90, Mean Corpuscular Hemoglobin 30, Mean Corpuscular 

Hemoglobin Concent 34, Red Cell Distribution Width 13.5, Platelet Count 199, 

Mean Platelet Volume 10.0, Immature Granulocyte % (Auto) 0, Neutrophils (%) 

(Auto) 86H, Lymphocytes (%) (Auto) 8L, Monocytes (%) (Auto) 6, Eosinophils (%) 

(Auto) 0, Basophils (%) (Auto) 0, Neutrophils # (Auto) 7.6, Lymphocytes # (Auto)

0.7L, Monocytes # (Auto) 0.5, Eosinophils # (Auto) 0.0, Basophils # (Auto) 0.0, 

Immature Granulocyte # (Auto) 0.0, Sodium Level 139, Potassium Level 4.0, 

Chloride Level 103, Carbon Dioxide Level 24, Anion Gap 12, Blood Urea Nitrogen 

14, Creatinine 0.98, Estimat Glomerular Filtration Rate > 60, BUN/Creatinine 

Ratio 14, Glucose Level 113H, Calcium Level 8.6, Corrected Calcium 8.7, Total 

Bilirubin 0.6, Aspartate Amino Transf (AST/SGOT) 18, Alanine Aminotransferase 

(ALT/SGPT) 24, Alkaline Phosphatase 60, Total Protein 5.9L, Albumin 3.9








Assessment/Plan


Assessment/Plan


Admission Diagonsis


SBO


Admission Status:  Inpatient Order (span 2 midnights)


Reason for Inpatient Admission:  


SBO partial Vs complete


Assessment/Plan


partial Vs complete SBO - hx of sigmoid resection, passing gas. 


BPH - home med use of flomax. 


Afib - aspirin only bloodthinner at home. 


HTN 





NPO


NG tube placed 


IV pain control - fentanyl


IV nausea control - zofran 


Hold aspirin, continue SCD.





SEYMOUR TOLBERT DO 2/9/21 1553:


History of Present Illness


History of Present Illness


Reason for visit/HPI


Patient is a 65 year old male who began having 10/10 abdominal pain last night. 

 Ate dinner without difficulty fell asleep on chair and work up to distended 

abdomen.  Had a normal bowel movement last nigh, but abdomen still distended.  

Felt it was a lot of gas.  Patient with pain in b/l lower abdomen.  Has passed a

little gas this morning and had NG tube in which has caused abdomen to 

significantly decrease.  Recently feeling constipated.    Has history of sigmoid

resection.   Having some nausea and one itme emesis.  Denies fever sweats chills

shortness of breath or chest pain.  Had ct scan suggestive of sbo.





Allergies and Home Medications


Allergies


Coded Allergies:  


     No Known Drug Allergies (Unverified , 1/7/20)





Home Medications


Aspirin 81 Mg Tab.chew, 81 MG PO DAILY, (Reported)


Diltiazem HCl 240 Mg Cap.er.24h, 240 MG PO DAILY, (Reported)


Lisinopril 10 Mg Tablet, 10 MG PO DAILY, (Reported)


Tamsulosin HCl 0.4 Mg Cap, 0.4 MG PO DAILY, (Reported)





Patient Home Medication List


Home Medication List Reviewed:  Yes





Past Medical-Social-Family Hx


Surgeries


Surgeries:  Abdominal (lower anterior sigmoid resection in 2003 )





Family Medical History


Significant Family History:  Other Conditions/Hx (Pts father had prostate 

cancer, no other FH of cancer. )





Review of Systems


Constitutional:  No diaphoresis, No fever


EENTM:  No blurred vision, No throat pain, No throat swelling


Respiratory:  No cough, No dyspnea on exertion, No short of breath


Cardiovascular:  No chest pain, No palpitations


Gastrointestinal:  abdominal pain, constipation; No diarrhea, No dysphagia; 

nausea, vomiting


Genitourinary:  No dysuria; hesitancy (BPH hx )


Musculoskeletal:  No muscle pain, No muscle stiffness


Skin:  No change in color, No change in hair/nails


Psychiatric/Neurological:  Denies Paresthesia, Denies Tremors





All Other Systems Reviewed


Negative Unless Noted:  Yes (Negative excepted noted.)





Physical Exam


General Appearance:  No Apparent Distress (sitting in chair), WD/WN


HEENT:  PERRL/EOMI, Normal ENT Inspection, Other (NG tube)


Neck:  Full Range of Motion, Non Tender


Respiratory:  Chest Non Tender, No Accessory Muscle Use, No Respiratory Distress


Cardiovascular:  No Murmur, Irregularly Irregular (afib hx)


Gastrointestinal:  Normal Bowel Sounds, No Organomegaly, Distended; No Guarding;

Tenderness (minimal diffuse)


Rectal:  Deferred


Back:  Normal Inspection, No CVA Tenderness, No Vertebral Tenderness


Extremity:  Normal Capillary Refill, Normal Inspection, Non Tender, No Calf 

Tenderness, Swelling (unilateral- Left side)


Neurologic/Psychiatric:  Alert, Oriented x3, No Motor/Sensory Deficits, Normal 

Mood/Affect, CNs II-XII Norm as Tested


Skin:  Normal Color, Warm/Dry


Lymphatic:  No Adenopathy





Assessment/Plan


Assessment/Plan


Admission Diagonsis


small bowel obstruction


nausea vomiting


abdominal pain diffuse (minimal)


Admission Status:  Inpatient Order (span 2 midnights)


Reason for Inpatient Admission:  


patient will need conservate measures and possibly need surgical


intervention.


Will stay 2 midnights due to need for further radiological studies and


monitoring of patient condition


Assessment/Plan


partial Vs complete SBO - hx of sigmoid resection, passing gas. 


BPH - home med use of flomax. 


Afib - aspirin only bloodthinner at home. 


HTN 





NPO


NG tube placed 


Small bowel follow through


IV fluids


IV pain control - fentanyl


IV nausea control - zofran 


continue SCD. for dvt prophylaxis


Hold oral meds


Consult Dr. Park for medical managment





Supervisory-Addendum Brief


Verification & Attestation


Participated in pt care:  history, MDM, physical


Personally performed:  exam, history, MDM, supervision of care


Care discussed with:  Medical Student


Procedures:  n/a


Results interpretation:  Verified all documentation


Verification and Attestation of Medical Student E/M Service





A medical student performed and documented this service in my presence. I 

reviewed and verified all information documented by the medical student and made

modifications to such information, when appropriate. I personally performed the 

physical exam and medical decision making. 





 Seymour Tolbert, Feb 9, 2021,16:00











EDELMIRA VÁSQUEZ MED STUDENT      Feb 9, 2021 08:36


SEYMOUR TOLBERT DO               Feb 9, 2021 15:53

## 2021-02-09 NOTE — DIAGNOSTIC IMAGING REPORT
PROCEDURE: CT urinary tract, rule out kidney stone.



TECHNIQUE: Multiple contiguous axial images were obtained through

the abdomen and pelvis without the use of intravenous contrast.

Auto Exposure Controls were utilized during the CT exam to meet

ALARA standards for radiation dose reduction. 



INDICATION:  Flank pain and abdominal pain.



No prior studies are available for comparison.



The lung bases demonstrate some minimal infiltrate or atelectasis

in the lingula as well as right lower lobe.

No discrete liver mass is detected. Gallbladder is unremarkable.

There is no biliary duct dilatation. Pancreas and spleen are

unremarkable. No adrenal mass is detected. Kidneys are without

evidence of calculi or hydronephrosis. There is a cyst in left

kidney 3.5 cm in size. Aorta is non-aneurysmal. No definite

ureteral or bladder calculi are detected. The stomach is

distended with fluid and debris. The small bowel is also

distended. There does appear to be a focal transition in the

right lower quadrant and findings are suggestive of a small bowel

obstruction. The colon demonstrates a moderate amount of stool.

Appendix is unremarkable. There are sutures at the rectosigmoid

junction. No free fluid or fluid collection is seen. There is no

free air. Prostate is unremarkable. Bony structures are nonacute.



IMPRESSION:

1. Minimal infiltrate or atelectasis in the lingula and right

lower lobe.

2. No evidence of urinary tract calculi or obstruction. There is

a left renal cyst.

3. Moderate distention of the stomach and small bowel loops with

transition in the right lower quadrant distally suggestive of

small bowel obstruction. No abscess formation or evidence of

pneumoperitoneum is identified.



Dictated by: 



  Dictated on workstation # [id number]

## 2021-02-09 NOTE — DIAGNOSTIC IMAGING REPORT
INDICATION: NG tube insertion



COMPARISON: Imaging from the same date



TECHNIQUE: Single radiograph of the chest dated 02/09/2021



FINDINGS: Enteric catheter is present with the sidehole within

the left upper abdomen, likely within the body of the stomach.

The distal tip is not completely included within the

field-of-view. The cardiac silhouette is borderline enlarged. No

significant pulmonary vascular congestion. Low lung volumes

without focal pulmonary opacity. No pleural effusion. No

pneumothorax. No acute osseous abnormality.



IMPRESSION: Enteric catheter is present extending into the

stomach as above.



Low lung volumes without focal pulmonary consolidation.



Dictated by: 



  Dictated on workstation # [id number]

## 2021-02-09 NOTE — DIAGNOSTIC IMAGING REPORT
INDICATION: Evaluate gastric tube.



COMPARISON: 02/09/2021



FINDINGS: Single frontal radiograph view of the abdomen was

obtained and demonstrates indwelling gastric tube with tip in the

stomach. Included small bowel loops are nondistended.



IMPRESSION:

1. Indwelling gastric tube with tip in the stomach.



Dictated by: 



  Dictated on workstation # [id number]

## 2021-02-09 NOTE — ED ABDOMINAL PAIN
General


Chief Complaint:  Abdominal/GI Problems


Stated Complaint:  ABD PAIN


Nursing Triage Note:  


PT AMBULATES TO ROOM #4 FROM POV WITH C/O ABD DISCOMFORT. REPORTS DISCOMFORT 


BEGAN THIS EVENING AFTER EATING A LARGE DINNER. REPORTS INTERMITTENT, SHARP PAIN




TO MEDIAL LOWER ABD WITH SENSATIONS OF BLOATING. REPORTS AFTER ONSET OF 


DISCOMFORT, HE PASSED A SMALL BOWEL MOVEMENT. REPORTS CONSTIPATION. REPORTS 


DIFFICULTY URINATING, BUT IS NOT NEW SYMPTOM. AUDIBLE BOWEL SOUNDS NOTED. A&OX4.


Sepsis Screen:  No Definite Risk


Source of Information:  Patient





History of Present Illness


Date Seen by Provider:  Feb 8, 2021


Time Seen by Provider:  23:28


Initial Comments


PT ARRIVES VIA POV FROM HOME


C/O SEVERE PAIN IN SUPRAPUBIC AREA AND SOME IN LEFT MID AND LOWER ABDOMEN--PAIN 

IS SHARP AND SEVERE AT TIMES--PAIN BEGAN AROUND 2100 TONIGHT, AFTER EATING A 

VERY LARGE MEAL AROUND 1830 TONIGHT


STATES HE FELL ASLEEP IN CHAIR AFTER DINNER AND WOKE UP AT 2100 WITH SEVERE PAIN


HAD SMALL BM AT 2100--HAS BEEN CONSTIPATED. NO IMPROVEMENT IN PAIN


VOIDED A SMALL AMOUNT AT 2100--STATES HE ALWAYS HAS SOME DIFFICULTY URINATING 

AND TAKES FLOMAX


NO NAUSEA/VOMITING


NO FEVER/SWEATS/CHILLS


NO BACK PAIN 


NOTHING WORSENS OR IMPROVES PAIN 


RATES PAIN 10/10 AT WORST





NO HISTORY OF SIMILAR


HAD PRIOR COLON RESECTION IN 1995 ( [facility] REPORT 2003) -OPEN [facility]

MY--FOR "PRECANCEROUS COLON POLYP".


PT MOST RECENTLY FOLLOWED BY DR. TOLBERT FOR COLONOSCOPIES





PCP: DR. JACQUES DUEÑAS


CARDIOLOGIST: DR. PERALTA


SURGEON: DR. TOLBERT





Allergies and Home Medications


Allergies


Coded Allergies:  


     No Known Drug Allergies (Unverified , 1/7/20)





Home Medications


Aspirin 81 Mg Tab.chew, 81 MG PO DAILY, (Reported)


Diltiazem HCl 240 Mg Cap.er.24h, 240 MG PO DAILY, (Reported)


Lisinopril 10 Mg Tablet, 10 MG PO DAILY, (Reported)


Tamsulosin HCl 0.4 Mg Cap, 0.4 MG PO DAILY, (Reported)





Patient Home Medication List


Home Medication List Reviewed:  Yes





Review of Systems


Review of Systems


Constitutional:  no symptoms reported; No chills, No diaphoresis, No fever


EENTM:  No Symptoms Reported


Respiratory:  No Symptoms Reported


Cardiovascular:  No Symptoms Reported


Gastrointestinal:  See HPI, Abdominal Pain, Constipated; Denies Nausea, Denies 

Poor Appetite, Denies Poor Fluid Intake, Denies Vomiting


Genitourinary:  See HPI; Denies Flank Pain


Musculoskeletal:  no symptoms reported; No back pain


Skin:  no symptoms reported


Psychiatric/Neurological:  No Symptoms Reported


Endocrine:  No Symptoms Reported


Hematologic/Lymphatic:  No Symptoms Reported





Past Medical-Social-Family Hx


Past Med/Social Hx:  Reviewed and Corrections made


Patient Social History


Alcohol Use:  Rarely Uses


Drug of Choice:  DENIES


Smoking Status:  Former Smoker (QUIT 1978)


Former Smoker, Quit:  Apr 25, 1978


2nd Hand Smoke Exposure:  Yes


Recent Infectious Disease Expo:  No


Recent Hopitalizations:  No





Immunizations Up To Date


Tetanus Booster (TDap):  More than 5yrs


PED Vaccines UTD:  No





Seasonal Allergies


Seasonal Allergies:  No





Past Medical History


Surgeries:  Yes (SEE BELOW)


Abdominal


Respiratory:  No


Cardiac:  Yes (A FIB--NO ANTICOAGULATION / 81 MG ASPIRIN ONLY. CHRONIC L LEG 

SWELLING )


Atrial Fibrillation, Chronic Edema/Swelling, Hypertension, Irregular Heartbeat


Neurological:  No


Reproductive Disorders:  No


Sexually Transmitted Disease:  No


HIV/AIDS:  No


Genitourinary:  Yes


Prostate Problems


Gastrointestinal:  Yes (CANCEROUS VS "PRECANCEROUS" COLON POLYPS-S/P COLON 

RESECTION 2003)


Diverticulosis, Polyps


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

LEG SWELLING)


Endocrine:  No


HEENT:  No


Loss of Vision:  Denies


Hearing Impairment:  Denies


Cancer:  Yes ("PRECANCEROUS COLON POLYPS" PER PT)


Colon


Did You Recieve Any Treatments:  Yes


What Type of Treatment Did You:  Surgical Intervention





CANCEROUS VS "PRECANCEROUS POLYPS" --S/P LOW ANTERIOR SIGMOID RESECTION


202/2003


LOW GRADE  LEFT INGUINAL AREA REMOVED 06/1993





NO CHEMO OR RADIATION 





HAS BEEN RELEASED BY ONCOLOGY AND NO LONGER FOLLOWS WITH THEM, PER PT


02/08/21


Psychosocial:  No


Integumentary:  No


Blood Disorders:  No


Adverse Reaction/Blood Tranf:  No (N/A)





Family Medical History


No Pertinent Family Hx





SOCIAL HISTORY: 


-RARE ETOH USE


-DENIES DRUG USE


-SMOKED 1 PPD, QUIT 1978





SURGICAL HISTORY:


-REMOVAL OF LEFT GROIN MASS 06/1993--LOW GRADE FIBROSARCOMA


-REMOVAL OF BENIGN ABDOMINAL WALL MASS 02/1995


-LOW ANTERIOR SIGMOID RESECTION 02/2003





-MULTIPLE COLONOSCOPIES/POLYPECTOMIES


-LAST COLONOSCOPY 02/2020--POLYPECTOMY X 3--DONE BY DR. TOLBERT.





Physical Exam


Vital Signs





Vital Signs - First Documented








 2/8/21





 23:28


 


Temp 36.4


 


Pulse 82


 


Resp 18


 


B/P (MAP) 152/80 (104)


 


Pulse Ox 97


 


O2 Delivery Room Air





Capillary Refill : Less Than 3 Seconds


Height/Weight/BMI


Height: 6'1.00"


Weight: 213lbs. 0.0oz. 96.571452tb; 29.00 BMI


Method:Stated


General Appearance:  WD/WN, no apparent distress (BUT LOOKS UNCOMFORTABLE)


HEENT:  PERRL/EOMI


Neck:  normal inspection


Respiratory:  normal breath sounds, no respiratory distress, no accessory muscle

use


Cardiovascular:  regular rate, rhythm, no murmur


Gastrointestinal:  abnormal bowel sounds (HYPERACTIVE), distended (AND VERY 

FIRM), tenderness (DIFFUSE LOWER ABDOMINAL TENDERNESS, "PRESSURE" DISCOMFORT ON 

PALPATION OVER REST OF ABDOMEN)


Extremities:  normal range of motion, non-tender, no calf tenderness, normal 

capillary refill, pedal edema (1+ EDEMA ON LEFT--PT STATES IS NORMAL AND CHRONIC

( HX OF  REMOVAL OF LIPOSARCOMA LEFT GROIN) )


Back:  no CVA tenderness


Neurologic/Psychiatric:  CNs II-XII nml as tested, no motor/sensory deficits, 

alert, oriented x 3


Skin:  normal color, warm/dry; No rash





Progress/Results/Core Measures


Results/Orders


Lab Results





Laboratory Tests








Test


 2/8/21


23:40 Range/Units


 


 


White Blood Count


 8.8 


 4.3-11.0


10^3/uL


 


Red Blood Count


 4.66 


 4.30-5.52


10^6/uL


 


Hemoglobin 14.0  13.3-17.7  g/dL


 


Hematocrit 42  40-54  %


 


Mean Corpuscular Volume 90  80-99  fL


 


Mean Corpuscular Hemoglobin 30  25-34  pg


 


Mean Corpuscular Hemoglobin


Concent 33 


 32-36  g/dL





 


Red Cell Distribution Width 13.6  10.0-14.5  %


 


Platelet Count


 183 


 130-400


10^3/uL


 


Mean Platelet Volume 9.3  9.0-12.2  fL


 


Immature Granulocyte % (Auto) 1   %


 


Neutrophils (%) (Auto) 82 H 42-75  %


 


Lymphocytes (%) (Auto) 10 L 12-44  %


 


Monocytes (%) (Auto) 6  0-12  %


 


Eosinophils (%) (Auto) 1  0-10  %


 


Basophils (%) (Auto) 0  0-10  %


 


Neutrophils # (Auto)


 7.3 


 1.8-7.8


10^3/uL


 


Lymphocytes # (Auto)


 0.9 L


 1.0-4.0


10^3/uL


 


Monocytes # (Auto)


 0.6 


 0.0-1.0


10^3/uL


 


Eosinophils # (Auto)


 0.1 


 0.0-0.3


10^3/uL


 


Basophils # (Auto)


 0.0 


 0.0-0.1


10^3/uL


 


Immature Granulocyte # (Auto)


 0.0 


 0.0-0.1


10^3/uL


 


Sodium Level 141  135-145  MMOL/L


 


Potassium Level 3.6  3.6-5.0  MMOL/L


 


Chloride Level 104    MMOL/L


 


Carbon Dioxide Level 26  21-32  MMOL/L


 


Anion Gap 11  5-14  MMOL/L


 


Blood Urea Nitrogen 14  7-18  MG/DL


 


Creatinine


 1.19 


 0.60-1.30


MG/DL


 


Estimat Glomerular Filtration


Rate > 60 


  





 


BUN/Creatinine Ratio 12   


 


Glucose Level 134 H   MG/DL


 


Calcium Level 9.1  8.5-10.1  MG/DL


 


Corrected Calcium 8.8  8.5-10.1  MG/DL


 


Magnesium Level 1.8  1.6-2.4  MG/DL


 


Total Bilirubin 0.5  0.1-1.0  MG/DL


 


Aspartate Amino Transf


(AST/SGOT) 18 


 5-34  U/L





 


Alanine Aminotransferase


(ALT/SGPT) 26 


 0-55  U/L





 


Alkaline Phosphatase 66    U/L


 


Total Protein 6.9  6.4-8.2  GM/DL


 


Albumin 4.4  3.2-4.5  GM/DL


 


Amylase Level 53    U/L


 


Lipase 20  8-78  U/L








My Orders





Orders - ITA DURHAM DO


Ed Iv/Invasive Line Start (2/8/21 23:34)


Monitor-Rhythm Ecg Trace Only (2/8/21 23:34)


Ct Abd/Pelvis Wo(Kidney Stone) (2/8/21 23:34)


Amylase (2/8/21 23:34)


Cbc With Automated Diff (2/8/21 23:34)


Comprehensive Metabolic Panel (2/8/21 23:34)


Lipase (2/8/21 23:34)


Magnesium (2/8/21 23:34)


Ua Culture If Indicated (2/8/21 23:34)


Ed Iv/Invasive Line Start (2/8/21 23:34)


Lactated Ringers (Lr 1000 Ml Iv Solution (2/8/21 23:45)


Ketorolac Injection (Toradol Injection) (2/8/21 23:34)


Acute Abd Series (2/9/21 00:01)


Ng Tube Insert & Assessment (2/9/21 00:49)





Medications Given in ED





Current Medications








 Medications  Dose


 Ordered  Sig/Yasmin


 Route  Start Time


 Stop Time Status Last Admin


Dose Admin


 


 Benzocaine  1 ea  STK-MED ONCE


 .ROUTE  2/9/21 00:46


 2/9/21 00:52 DC 2/9/21 01:15


1 EA


 


 Lactated Ringer's  1,000 ml @ 


 0 mls/hr  Q0M ONCE


 IV  2/8/21 23:45


 2/8/21 23:46 DC 2/8/21 23:45


0 MLS/HR








Vital Signs/I&O











 2/8/21





 23:28


 


Temp 36.4


 


Pulse 82


 


Resp 18


 


B/P (MAP) 152/80 (104)


 


Pulse Ox 97


 


O2 Delivery Room Air














Blood Pressure Mean:                    104











Progress


Progress Note :  


Progress Note


GIVEN TORADOL WITH MUCH IMPROVEMENT IN PAIN





NG TUBE PLACED





NO DETERIORATION IN PT'S CONDITION DURING ER STAY





Diagnostic Imaging





Comments


ABDOMEN XRAYS--??SBO? PENDING RADIOLOGIST REVIEW





CT ABDOMEN/PELVIS--HIGH GRADE DISTAL SMALL BOWEL OBSTRUCTION, WITH TRANSITION 

ZONE IN RLQ--PER [facility] VIA FAX AT 2098


   Reviewed:  Reviewed by Me





Departure


Communication (Admissions)


0047--SPOKE WITH DR. TOLBERT, ACCEPTS PT FOR ADMIT.





Impression





   Primary Impression:  


   Small bowel obstruction


   Additional Impression:  


   HX OF COLON RESECTION FOR CANCEROUS POLYPS


Disposition:  09 ADMITTED AS INPATIENT


Condition:  Stable





Admissions


Decision to Admit Reason:  Admit from ER (General)


Decision to Admit/Date:  Feb 9, 2021


Time/Decision to Admit Time:  00:45





Departure-Patient Inst.


Referrals:  


SUMI DUEÑAS MD (PCP/Family)


Primary Care Physician











ITA DURHAM DO                  Feb 9, 2021 00:28

## 2021-02-10 VITALS — SYSTOLIC BLOOD PRESSURE: 142 MMHG | DIASTOLIC BLOOD PRESSURE: 74 MMHG

## 2021-02-10 VITALS — SYSTOLIC BLOOD PRESSURE: 121 MMHG | DIASTOLIC BLOOD PRESSURE: 71 MMHG

## 2021-02-10 VITALS — DIASTOLIC BLOOD PRESSURE: 87 MMHG | SYSTOLIC BLOOD PRESSURE: 167 MMHG

## 2021-02-10 VITALS — SYSTOLIC BLOOD PRESSURE: 165 MMHG | DIASTOLIC BLOOD PRESSURE: 82 MMHG

## 2021-02-10 VITALS — SYSTOLIC BLOOD PRESSURE: 125 MMHG | DIASTOLIC BLOOD PRESSURE: 68 MMHG

## 2021-02-10 VITALS — SYSTOLIC BLOOD PRESSURE: 120 MMHG | DIASTOLIC BLOOD PRESSURE: 64 MMHG

## 2021-02-10 RX ADMIN — DEXTROSE MONOHYDRATE, SODIUM CHLORIDE, AND POTASSIUM CHLORIDE SCH MLS/HR: 50; 4.5; 1.49 INJECTION, SOLUTION INTRAVENOUS at 13:12

## 2021-02-10 RX ADMIN — PANTOPRAZOLE SODIUM SCH MG: 40 INJECTION, POWDER, FOR SOLUTION INTRAVENOUS at 08:14

## 2021-02-10 RX ADMIN — DEXTROSE MONOHYDRATE, SODIUM CHLORIDE, AND POTASSIUM CHLORIDE SCH MLS/HR: 50; 4.5; 1.49 INJECTION, SOLUTION INTRAVENOUS at 04:07

## 2021-02-10 NOTE — CONSULTATION
History of Present Illness


History of Present Illness


Patient Consulted On([place]/time)


2/10/21


 13:58


Date Seen by Provider:  Feb 10, 2021


Time Seen by Provider:  08:30


Reason for Visit:  small bowel obstruction


History of Present Illness


64 yo M admitted for concern for small bowel obstruction.  He reported it 

started after eating a big meal.  Severe abdominal pain LLQ.  No radiation.  He 

felt like he had to have a bowel movement so he tried and has one.  Denies any 

blood in stool.  No blood in emesis.  Nausea and vomiting started after 

admission.  He is passing gas.  He is feeling much better pain-wise.   The stuff

he had to drink for the small bowel study seems to be moving through him because

he is having loose stools now.  NG tube with some bilious output.  


He denies any chest pain or palpitations.  Denies going in to atrial 

fibrillation which he is aware of.





Risk factor Colon resection 2005


fibrous sarcoma left leg





Covid positive 11/28/20-  no issues with it though,  mild URI symptoms.





Allergies and Home Medications


Allergies


Coded Allergies:  


     No Known Drug Allergies (Unverified , 1/7/20)





Home Medications


Aspirin 81 Mg Tab.chew, 81 MG PO DAILY, (Reported)


Diltiazem HCl 240 Mg Cap.er.24h, 240 MG PO DAILY, (Reported)


Lisinopril 10 Mg Tablet, 10 MG PO DAILY, (Reported)


Tamsulosin HCl 0.4 Mg Cap, 0.4 MG PO DAILY, (Reported)





Patient Home Medication List


Home Medication List Reviewed:  Yes





Past Medical-Social-Family Hx


Past Med/Social Hx:  Reviewed and Corrections made


Patient Social History


Alcohol Use:  Rarely Uses


Drug of Choice:  DENIES


Smoking Status:  Former Smoker (4-5 Pack year hx )


Former Smoker, Quit:  Apr 25, 1978


2nd Hand Smoke Exposure:  Yes


Recent Infectious Disease Expo:  No


Recent Hopitalizations:  No


Have you traveled recently?:  No


Alcohol Use?:  No





Immunizations Up To Date


Tetanus Booster (TDap):  More than 5yrs


PED Vaccines UTD:  No





Seasonal Allergies


Seasonal Allergies:  No





Past Medical History


Surgeries:  Yes (SEE BELOW)


Abdominal (lower anterior sigmoid resection in 2003 )


Respiratory:  No


Cardiac:  Yes (A FIB--NO ANTICOAGULATION / 81 MG ASPIRIN ONLY. CHRONIC L LEG 

SWELLING )


Atrial Fibrillation, Chronic Edema/Swelling, Hypertension, Irregular Heartbeat


Neurological:  No


Reproductive Disorders:  No


Sexually Transmitted Disease:  No


HIV/AIDS:  No


Genitourinary:  Yes


Prostate Problems


Gastrointestinal:  Yes (CANCEROUS VS "PRECANCEROUS" COLON POLYPS-S/P COLON 

RESECTION 2003)


Diverticulosis, Polyps


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

LEG SWELLING)


Endocrine:  No


HEENT:  No


Loss of Vision:  Denies


Hearing Impairment:  Denies


Cancer:  Yes ("PRECANCEROUS COLON POLYPS" PER PT)


Colon


Did You Recieve Any Treatments:  Yes


What Type of Treatment Did You:  Surgical Intervention





CANCEROUS VS "PRECANCEROUS POLYPS" --S/P LOW ANTERIOR SIGMOID RESECTION


202/2003


LOW GRADE  LEFT INGUINAL AREA REMOVED 06/1993





NO CHEMO OR RADIATION 





HAS BEEN RELEASED BY ONCOLOGY AND NO LONGER FOLLOWS WITH THEM, PER PT


02/08/21


Psychosocial:  No


Integumentary:  No


Blood Disorders:  No


Adverse Reaction/Blood Tranf:  No (N/A)





Family Medical History


Other Conditions/Hx (Pts father had prostate cancer, no other FH of cancer. )





SOCIAL HISTORY: 


-RARE ETOH USE


-DENIES DRUG USE


-SMOKED 1 PPD, QUIT 1978





SURGICAL HISTORY:


-REMOVAL OF LEFT GROIN MASS 06/1993--LOW GRADE FIBROSARCOMA


-REMOVAL OF BENIGN ABDOMINAL WALL MASS 02/1995


-LOW ANTERIOR SIGMOID RESECTION 02/2003





-MULTIPLE COLONOSCOPIES/POLYPECTOMIES


-LAST COLONOSCOPY 02/2020--POLYPECTOMY X 3--DONE BY DR. TOLBERT.





Review of Systems


Review of Systems


General:  No Chills, No Night Sweats


Pulmonary:  No Dyspnea, No Cough, No Pleuritic Chest Pain


Cardiovascular:  No: Chest Pain, Palpitations


Gastrointestinal:  Nausea, Vomiting (one time), Abdominal Pain; No: Diarrhea, 

Constipation, Melena, Hematochezia





All Other Systems Reviewed


All Other Systems Reviewed:  Yes (Negative excepted noted.)





Physical Exam


Vital Signs





Vital Signs - First Documented








 2/8/21





 23:28


 


Temp 36.4


 


Pulse 82


 


Resp 18


 


B/P (MAP) 152/80 (104)


 


Pulse Ox 97


 


O2 Delivery Room Air





Capillary Refill : Less Than 3 Seconds


Height, Weight, BMI


Height: 6'1.00"


Weight: 213lbs. 0.0oz. 96.950641sb; 30.37 BMI


Method:Stated


General Appearance:  No Apparent Distress


HEENT:  PERRL/EOMI


Neck:  Non Tender, Supple


Respiratory:  Chest Non Tender, Lungs Clear, Normal Breath Sounds, No Accessory 

Muscle Use, No Respiratory Distress


Cardiovascular:  Regular Rate, Rhythm


Gastrointestinal:  Soft


Rectal:  Deferred


Back:  Normal Inspection, No CVA Tenderness


Extremity:  Non Tender, Pedal Edema (left leg more edema)


Neurologic/Psychiatric:  Alert, Oriented x3


Skin:  Warm/Dry





Assessment/Plan


Assessment/Plan


Assessment and Plan


continue to monitor bowels-  seems to be opening up with the small bowel follow 

through as he has had bowel movements.  NG is still putting out bilious fluid.


-monitoring heart rhythm as he could go into Afib with RVR.   Currently in NSR.





will continue to follow along.


Problems:  


(1) Small bowel obstruction


(2) Paroxysmal A-fib


(3) HTN (hypertension)


Qualifiers:  


   Qualified Codes:  I10 - Essential (primary) hypertension


Assessment & Plan:  will monitor blood pressure-  and act accordingly.


currently under good control





(4) BPH associated with nocturia


Assessment & Plan:  on tamsulosin will resume when taking po.














SUMI DUEÑAS MD              Feb 10, 2021 14:00

## 2021-02-10 NOTE — PROGRESS NOTE - SURGERY
EDELMIRA VÁSQUEZ MED STUDENT 2/10/21 0729:


Subjective


Date Seen by a Provider:  Feb 10, 2021


Time Seen by a Provider:  06:55


Subjective/Events-last exam


Pt fully alert and oriented. Pt last passed stool 5 minutes before visit- no 

melena or hematochezia. Pt last experienced N/Ving yesterday am, none since. No 

abd pain. Pt still has NG tube in. Pt denies any new complaints.


Review of Systems


General:  No Chills, No Night Sweats


Pulmonary:  No Dyspnea, No Cough, No Pleuritic Chest Pain


Cardiovascular:  No: Chest Pain, Palpitations


Gastrointestinal:  No: Nausea, Vomiting, Abdominal Pain, Diarrhea, Constipation,

Melena, Hematochezia





Objective


Exam





Vital Signs








  Date Time  Temp Pulse Resp B/P (MAP) Pulse Ox O2 Delivery O2 Flow Rate FiO2


 


2/10/21 04:00 36.4 74 18 121/71 (88) 94 Room Air  


 


2/10/21 01:00  74      


 


2/10/21 00:00 36.1 77 18 125/68 (87) 95 Room Air  


 


2/9/21 20:00      Room Air  


 


2/9/21 19:48 36.8 80 18 126/74 (91) 96 Room Air  


 


2/9/21 19:00  74      


 


2/9/21 16:35 37.0 84 16 121/72 (88) 96 Room Air  


 


2/9/21 13:38  60      


 


2/9/21 11:46 36.9 65 18 124/61 (82) 97 Room Air  


 


2/9/21 08:40 35.2 83 16 146/83 (104) 97 Room Air  


 


2/9/21 08:00      Room Air  














I & O 


 


 2/10/21





 07:00


 


Intake Total 0 ml


 


Output Total 1075 ml


 


Balance -1075 ml





Capillary Refill : Less Than 3 Seconds


General Appearance:  No Apparent Distress, WD/WN


HEENT:  PERRL/EOMI, Normal ENT Inspection, Other (NG tube)


Neck:  Full Range of Motion, Non Tender


Respiratory:  Chest Non Tender, No Accessory Muscle Use, No Respiratory Distress


Cardiovascular:  No Murmur, Irregularly Irregular (afib hx)


Peripheral Pulses:  2+ Radial Pulses (R), 2+ Radial Pulses (L)


Gastrointestinal:  non tender, soft, abnormal bowel sounds (hyperactive BS x 4 

quadrants. )


Extremity:  Normal Capillary Refill, Normal Inspection, Non Tender, No Calf 

Tenderness; No Calf Tenderness; Swelling (unilateral- Left side), Other (neg 

emperatriz sign b/l )


Neurologic/Psychiatric:  Alert, Oriented x3, No Motor/Sensory Deficits, Normal 

Mood/Affect, CNs II-XII Norm as Tested


Skin:  Normal Color, Warm/Dry


Lymphatic:  No Adenopathy





Assessment/Plan











partial SBO Vs ileus- SB follow through 2/9 showed 1hr transit time. 


BPH


Afib - aspirin only bloodthinner at home. 


HTN 





Advance diet - liquid. 


remove NG tube.  


IV pain control - fentanyl


IV nausea control - zofran 


IV fluids


Hold aspirin today, continue SCD.





SEYMOUR TOLBERT DO 2/10/21 1518:


Subjective


Subjective/Events-last exam


Feeling better.  +bm and flatus.  No n/v.  No abdominal pain.  NG tube clamped. 

 SBFT showing transition of contrast into colon without obstruction.





Objective


Exam


General Appearance:  No Apparent Distress, WD/WN


HEENT:  PERRL/EOMI, Normal ENT Inspection


Neck:  Full Range of Motion, Non Tender


Respiratory:  Chest Non Tender, No Accessory Muscle Use, No Respiratory Distress


Cardiovascular:  Regular Rate, Rhythm, No JVD


Gastrointestinal:  non tender, soft; No distended


Extremity:  Normal Inspection, Non Tender, Other


Neurologic/Psychiatric:  Alert, Oriented x3, No Motor/Sensory Deficits, Normal 

Mood/Affect, CNs II-XII Norm as Tested


Skin:  Normal Color, Warm/Dry


Lymphatic:  No Adenopathy





Assessment/Plan


partial SBO -SB follow through 2/9 showed 1hr transit time. 


BPH


Afib - aspirin only bloodthinner at home. 


HTN 





Advance diet - liquid. 


remove NG tube.  


IV pain control - fentanyl


IV nausea control - zofran 


IV fluids


Hold 


dvt prophylaxis continue SCD.





Supervisory-Addendum Brief


Verification & Attestation


Participated in pt care:  history, MDM, physical


Personally performed:  exam, history, MDM, supervision of care


Care discussed with:  Medical Student


Procedures:  n/a


Results interpretation:  Verified all documentation


Verification and Attestation of Medical Student E/M Service





A medical student performed and documented this service in my presence. I 

reviewed and verified all information documented by the medical student and made

modifications to such information, when appropriate. I personally performed the 

physical exam and medical decision making. 





 Es Faustb 10, 2021,15:18











EDELMIRA VÁSQUEZ MED STUDENT     Feb 10, 2021 07:29


SEYMOUR TOLBERT DO              Feb 10, 2021 15:18

## 2021-02-11 VITALS — DIASTOLIC BLOOD PRESSURE: 69 MMHG | SYSTOLIC BLOOD PRESSURE: 136 MMHG

## 2021-02-11 VITALS — DIASTOLIC BLOOD PRESSURE: 70 MMHG | SYSTOLIC BLOOD PRESSURE: 133 MMHG

## 2021-02-11 VITALS — SYSTOLIC BLOOD PRESSURE: 154 MMHG | DIASTOLIC BLOOD PRESSURE: 71 MMHG

## 2021-02-11 VITALS — DIASTOLIC BLOOD PRESSURE: 71 MMHG | SYSTOLIC BLOOD PRESSURE: 154 MMHG

## 2021-02-11 VITALS — DIASTOLIC BLOOD PRESSURE: 60 MMHG | SYSTOLIC BLOOD PRESSURE: 123 MMHG

## 2021-02-11 RX ADMIN — PANTOPRAZOLE SODIUM SCH MG: 40 INJECTION, POWDER, FOR SOLUTION INTRAVENOUS at 08:42

## 2021-02-11 NOTE — PROGRESS NOTE
Subjective


Subjective


Date Seen by Provider:  Feb 11, 2021


Time Seen by Provider:  08:35


Doing well.  NG tube out,  advancing diet.


ready to go home.





Review of Systems


General:  No Chills, No Night Sweats


HEENT:  No Dysphasia, No Sore Throat


Pulmonary:  No Dyspnea, No Cough


Cardiovascular:  No: Chest Pain, Palpitations


Gastrointestinal:  No: Nausea, Vomiting, Abdominal Pain, Diarrhea, Constipation,

Melena, Hematochezia





All Other Systems Reviewed


All Other Systems Reviewed:  Yes (Negative excepted noted.)





Objective


Exam


Vital Signs


36 C


59 bpm


133/70


20 breath/min


97% oxygen  room air


General Appearance:  No Apparent Distress, WD/WN


HEENT:  PERRL/EOMI, Normal ENT Inspection


Neck:  Full Range of Motion, Non Tender


Respiratory:  Chest Non Tender, No Accessory Muscle Use, No Respiratory Distress


Cardiovascular:  Regular Rate, Rhythm, No JVD


Gastrointestinal:  Normal Bowel Sounds, No Organomegaly, Distended; No Guarding;

Tenderness (minimal diffuse)


Rectal:  Deferred


Back:  Normal Inspection, No CVA Tenderness, No Vertebral Tenderness


Extremity:  Non Tender, No Calf Tenderness


Neurologic/Psychiatric:  Alert, Oriented x3, No Motor/Sensory Deficits, Normal 

Mood/Affect, CNs II-XII Norm as Tested


Skin:  Normal Color, Warm/Dry


Lymphatic:  No Adenopathy





Assessment/Plan


Assessment/Plan


Assessment and Plan


2/10/21  continue to monitor bowels-  seems to be opening up with the small 

bowel follow through as he has had bowel movements.  NG is still putting out 

bilious fluid.


-monitoring heart rhythm as he could go into Afib with RVR.   Currently in NSR.





2/11/21- cleared from medical standpoint to go home ; standard post- SBO 

instructions-  continue to advance activities.  fluid hydration.  





followup at [facility] for routine follow ups.


Problems:  


(1) Small bowel obstruction


(2) Paroxysmal A-fib


(3) HTN (hypertension)


Qualifiers:  


   Qualified Codes:  I10 - Essential (primary) hypertension


(4) BPH associated with nocturia











SUMI DUEÑAS MD              Feb 11, 2021 16:37

## 2021-02-11 NOTE — DISCHARGE SUMMARY
DATE OF SERVICE:  



ADMITTING PHYSICIAN:

Seymour Ortiz DO



CONSULTING PHYSICIAN:

Antione Park MD



ADMITTING DIAGNOSES:

Small bowel obstruction, nausea, vomiting, abdominal pain diffuse, BPH, history

of AFib, and hypertension.



DISCHARGE DIAGNOSES:

Partial small bowel obstruction, BPH, AFib, history of hypertension.



HOSPITAL COURSE:

The patient is a 65-year-old male, who had sudden onset of abdominal pain and

nausea and vomiting.  It was decided to come to the Emergency Department for

further evaluation.  He had a CT scan showing minimal infiltrate or atelectasis

in the lingula and right lower lobe.  No evidence of urinary tract calculi or

obstruction.  There is a left renal cyst.  Moderate distention of the stomach

and small bowel loops with transition in the right lower quadrant distally

suggestive of small bowel obstruction.  No abscess formation or evidence of

pneumoperitoneum is identified.  The patient was admitted to the hospital for

further studies and conservative measures.  He had a NG tube placed and was

decompressed.  He was kept n.p.o.  The patient had a small bowel follow through

performed on 02/09/2021, which demonstrated a small bowel transit time near 1

hour.  No evidence of high-grade bowel obstruction, dilated stomach with

borderline dilated small bowel and then postsurgical changes associated with the

colon with moderate amount of stool throughout the colon.  The patient began

having flatus and having bowel function.  The NG tube was removed and the

patient was started on clear liquid diet.  Today on 02/11/2021, the patient is

still having bowel function, he is tolerating diet.  The patient discharged home

in stable condition.  Please see hospital chart for further information.





Job ID: [id number]

DocumentID: [id number]

Dictated Date:  02/11/2021 14:58:37

Transcription Date: 02/11/2021 19:45:36

Dictated By: SEYMOUR ORTIZ DO

## 2021-02-11 NOTE — PROGRESS NOTE - SURGERY
EDELMIRA VÁSQUEZ MED STUDENT 2/11/21 0757:


Subjective


Date Seen by a Provider:  Feb 11, 2021


Time Seen by a Provider:  07:10


Subjective/Events-last exam


Pt passed stool and gas this morning. No N/Ving. No abd pain. Pt denies any new 

concerns. Pt tolerating liquid diet well without post-prandial pain, distension,

or dysphagia.


Review of Systems


General:  No Chills, No Night Sweats


HEENT:  No Dysphasia, No Sore Throat


Pulmonary:  No Dyspnea, No Cough


Cardiovascular:  No: Chest Pain, Palpitations


Gastrointestinal:  No: Nausea, Vomiting, Abdominal Pain, Diarrhea, Constipation,

Melena, Hematochezia





Objective


Exam





Vital Signs








  Date Time  Temp Pulse Resp B/P (MAP) Pulse Ox O2 Delivery O2 Flow Rate FiO2


 


2/11/21 04:33  60 18 136/69 (91) 96 Room Air  


 


2/11/21 01:00  50      


 


2/11/21 00:00  68 18 123/60 (81) 97 Room Air  


 


2/10/21 20:00      Room Air  


 


2/10/21 19:00 36.8 59 20 165/82 (109) 99 Room Air  


 


2/10/21 19:00  60      


 


2/10/21 16:00 36.0 62 16 167/87 (113) 96 Room Air  


 


2/10/21 12:28  68      


 


2/10/21 12:00 36.4 76 16 120/64 (82) 94 Room Air  


 


2/10/21 08:00 36.3 67 16 142/74 (96) 93 Room Air  


 


2/10/21 08:00      Room Air  














I & O 


 


 2/11/21





 07:00


 


Intake Total 2180 ml


 


Output Total 1950 ml


 


Balance 230 ml





Capillary Refill : Less Than 3 Seconds


General Appearance:  No Apparent Distress, WD/WN


HEENT:  PERRL/EOMI, Normal ENT Inspection


Neck:  Full Range of Motion, Non Tender


Respiratory:  Chest Non Tender, No Accessory Muscle Use, No Respiratory Distress


Cardiovascular:  No JVD, Irregularly Irregular


Peripheral Pulses:  2+ Radial Pulses (R), 2+ Radial Pulses (L)


Gastrointestinal:  normal bowel sounds, non tender, soft; No distended


Extremity:  Normal Inspection, Non Tender, No Calf Tenderness


Neurologic/Psychiatric:  Alert, Oriented x3, No Motor/Sensory Deficits, Normal 

Mood/Affect, CNs II-XII Norm as Tested


Skin:  Normal Color, Warm/Dry


Lymphatic:  No Adenopathy





Assessment/Plan








partial SBO


BPH


Afib


HTN 





Advance diet to softs, can try solids this afternoon.   


Restart home medications this morning, including aspirin


Consider afternoon discharge today if pt tolerates advanced diet.





SEYMOUR ORTIZ DO 2/11/21 1454:


Subjective


Subjective/Events-last exam


Patient passing flatus and having bowel movements.  He is tolerating liquid 

diet.  No abdominal pain.  Has no new complaints.  Patient wanting to go home.  

Patient denies any nausea vomiting fever sweats chills shortness of breath or 

chest pain.





Objective


Exam


General Appearance:  No Apparent Distress, WD/WN


HEENT:  PERRL/EOMI, Normal ENT Inspection


Neck:  Full Range of Motion, Non Tender


Respiratory:  Chest Non Tender, No Accessory Muscle Use, No Respiratory Distress


Cardiovascular:  Regular Rate, Rhythm, No JVD


Gastrointestinal:  non tender, soft; No distended


Extremity:  Non Tender, No Calf Tenderness


Neurologic/Psychiatric:  Alert, Oriented x3, No Motor/Sensory Deficits, Normal 

Mood/Affect, CNs II-XII Norm as Tested


Skin:  Normal Color, Warm/Dry


Lymphatic:  No Adenopathy





Assessment/Plan





partial SBO


BPH


Afib hx


HTN 





Patient tolerating diet.  Can advance as tolerates.  Patient okay to DC home.  

He has return of bowel function and not having any other symptoms at this time. 

Patient informed that this could return in short period or long term its 

unpredictable.  Patient understands along with his wife.





Supervisory-Addendum Brief


Verification & Attestation


Participated in pt care:  history, MDM, physical


Personally performed:  exam, history, MDM, supervision of care


Care discussed with:  Medical Student


Procedures:  n/a


Results interpretation:  Verified all documentation


Verification and Attestation of Medical Student E/M Service





A medical student performed and documented this service in my presence. I 

reviewed and verified all information documented by the medical student and made

modifications to such information, when appropriate. I personally performed the 

physical exam and medical decision making. 





 Seymour Ortiz, Feb 11, 2021,14:54











EDELMIRA VÁSQUEZ MED STUDENT     Feb 11, 2021 07:57


SEYMOUR ORTIZ DO              Feb 11, 2021 14:54

## 2021-02-11 NOTE — DISCHARGE INST-SIMPLE/STANDARD
Discharge Inst-Standard


Patient Instructions/Follow Up


Plan of Care/Instructions/FU:  


Diana as needed.  


Keep any regulary scheduled appointments.


Activity as Tolerated:  Yes


Discharge Diet:  Regular Diet (as tolerates.)


Other Inst to Patient


Follow up Appt:


Follow up on as needed basis.


Keep any regular scheduled appointments.





Diet as tolerates.











Symptoms to Report:


Appetite Changes, Extremity Discoloration, Numbness/Tingling, Swelling 

Increased, Bleeding Excessive, Eyesight Changes, Pain Increased, Urine Color 

Change, Constipation(Persistent), Fever over 101 degree F, Pain/Pressure in 

chest, Urinating Difficulty, Cough Up/Vomit Blood, Heart Beat Irreg/Pounding, 

Pain/Pressure in jaw, Vaginal Bleeding Increase, Cramps in feet or legs, Light

headedness, Pain/Pressure in shoulder, Diarrhea(Persistent), Memory Changes 

Suddenly, Questions/Concerns, Weight gain consecutive days, Dizziness/Fainting, 

Nausea/Vomiting, Shortness of Breath, Weight gain over 2 pounds








If questions or concerns contact your physician 


Or seek help at emergency department.











SEYMOUR TOLBERT DO              Feb 11, 2021 14:50

## 2022-11-29 ENCOUNTER — HOSPITAL ENCOUNTER (OUTPATIENT)
Dept: HOSPITAL 75 - ER | Age: 67
Setting detail: OBSERVATION
LOS: 1 days | Discharge: HOME | End: 2022-11-30
Attending: INTERNAL MEDICINE | Admitting: INTERNAL MEDICINE
Payer: COMMERCIAL

## 2022-11-29 VITALS — HEIGHT: 73.19 IN | WEIGHT: 233.25 LBS | BODY MASS INDEX: 30.58 KG/M2

## 2022-11-29 DIAGNOSIS — R35.1: ICD-10-CM

## 2022-11-29 DIAGNOSIS — N30.00: ICD-10-CM

## 2022-11-29 DIAGNOSIS — K56.600: Primary | ICD-10-CM

## 2022-11-29 DIAGNOSIS — I10: ICD-10-CM

## 2022-11-29 DIAGNOSIS — Z87.891: ICD-10-CM

## 2022-11-29 DIAGNOSIS — N40.1: ICD-10-CM

## 2022-11-29 LAB
ALBUMIN SERPL-MCNC: 4.3 GM/DL (ref 3.2–4.5)
ALP SERPL-CCNC: 77 U/L (ref 40–136)
ALT SERPL-CCNC: 29 U/L (ref 0–55)
BASOPHILS # BLD AUTO: 0 10^3/UL (ref 0–0.1)
BASOPHILS NFR BLD AUTO: 1 % (ref 0–10)
BILIRUB SERPL-MCNC: 0.6 MG/DL (ref 0.1–1)
BUN/CREAT SERPL: 13
CALCIUM SERPL-MCNC: 9.1 MG/DL (ref 8.5–10.1)
CHLORIDE SERPL-SCNC: 100 MMOL/L (ref 98–107)
CO2 SERPL-SCNC: 25 MMOL/L (ref 21–32)
CREAT SERPL-MCNC: 1.17 MG/DL (ref 0.6–1.3)
EOSINOPHIL # BLD AUTO: 0.1 10^3/UL (ref 0–0.3)
EOSINOPHIL NFR BLD AUTO: 2 % (ref 0–10)
GFR SERPLBLD BASED ON 1.73 SQ M-ARVRAT: 67 ML/MIN
GLUCOSE SERPL-MCNC: 114 MG/DL (ref 70–105)
HCT VFR BLD CALC: 43 % (ref 40–54)
HGB BLD-MCNC: 14.6 G/DL (ref 13.3–17.7)
LIPASE SERPL-CCNC: 25 U/L (ref 8–78)
LYMPHOCYTES # BLD AUTO: 1.2 10^3/UL (ref 1–4)
LYMPHOCYTES NFR BLD AUTO: 17 % (ref 12–44)
MAGNESIUM SERPL-MCNC: 2.2 MG/DL (ref 1.6–2.4)
MANUAL DIFFERENTIAL PERFORMED BLD QL: NO
MCH RBC QN AUTO: 31 PG (ref 25–34)
MCHC RBC AUTO-ENTMCNC: 34 G/DL (ref 32–36)
MCV RBC AUTO: 91 FL (ref 80–99)
MONOCYTES # BLD AUTO: 0.5 10^3/UL (ref 0–1)
MONOCYTES NFR BLD AUTO: 7 % (ref 0–12)
NEUTROPHILS # BLD AUTO: 5.2 10^3/UL (ref 1.8–7.8)
NEUTROPHILS NFR BLD AUTO: 73 % (ref 42–75)
PLATELET # BLD: 190 10^3/UL (ref 130–400)
PMV BLD AUTO: 9.5 FL (ref 9–12.2)
POTASSIUM SERPL-SCNC: 3.9 MMOL/L (ref 3.6–5)
PROT SERPL-MCNC: 7 GM/DL (ref 6.4–8.2)
SODIUM SERPL-SCNC: 139 MMOL/L (ref 135–145)
WBC # BLD AUTO: 7.1 10^3/UL (ref 4.3–11)

## 2022-11-29 PROCEDURE — 81000 URINALYSIS NONAUTO W/SCOPE: CPT

## 2022-11-29 PROCEDURE — 96361 HYDRATE IV INFUSION ADD-ON: CPT

## 2022-11-29 PROCEDURE — 83735 ASSAY OF MAGNESIUM: CPT

## 2022-11-29 PROCEDURE — 84484 ASSAY OF TROPONIN QUANT: CPT

## 2022-11-29 PROCEDURE — 87088 URINE BACTERIA CULTURE: CPT

## 2022-11-29 PROCEDURE — 80048 BASIC METABOLIC PNL TOTAL CA: CPT

## 2022-11-29 PROCEDURE — 36415 COLL VENOUS BLD VENIPUNCTURE: CPT

## 2022-11-29 PROCEDURE — 80053 COMPREHEN METABOLIC PANEL: CPT

## 2022-11-29 PROCEDURE — 83690 ASSAY OF LIPASE: CPT

## 2022-11-29 PROCEDURE — 85027 COMPLETE CBC AUTOMATED: CPT

## 2022-11-29 PROCEDURE — 87077 CULTURE AEROBIC IDENTIFY: CPT

## 2022-11-29 PROCEDURE — 85025 COMPLETE CBC W/AUTO DIFF WBC: CPT

## 2022-11-29 PROCEDURE — 85007 BL SMEAR W/DIFF WBC COUNT: CPT

## 2022-11-29 PROCEDURE — 96376 TX/PRO/DX INJ SAME DRUG ADON: CPT

## 2022-11-29 PROCEDURE — 83605 ASSAY OF LACTIC ACID: CPT

## 2022-11-29 PROCEDURE — 74177 CT ABD & PELVIS W/CONTRAST: CPT

## 2022-11-29 NOTE — ED ABDOMINAL PAIN
General


Stated Complaint:  ABD PAIN





History of Present Illness


Date Seen by Provider:  Nov 29, 2022


Time Seen by Provider:  23:01


Initial Comments


67-year-old male with PMH of past small bowel obstruction many years ago, is 

here with complaints of sudden onset of abdominal pain after he had dinner 

today.  Patient had Thanksgiving leftovers for dinner.  Pain is colicky in 

nature and makes him double over when a wave of pain occurs.  Patient states 

that it feels similar to when he had a bowel obstruction many years ago.  Denies

having constipation or diarrhea, fever, nausea and vomiting, chest pain, 

palpitations.





Allergies and Home Medications


Allergies


Coded Allergies:  


     No Known Drug Allergies (Unverified , 1/7/20)





Patient Home Medication List


Home Medication List Reviewed:  Yes


Aspirin (Aspirin) 81 Mg Tab.chew, 81 MG PO DAILY, (Reported)


   Entered as Reported by: RITA AN on 1/7/20 1525


Diltiazem HCl (Diltiazem 24Hr ER) 240 Mg Cap.er.24h, 240 MG PO DAILY, (Reported)


   Entered as Reported by: RAMON POPE on 2/9/21 1102


Lisinopril (Lisinopril) 10 Mg Tablet, 10 MG PO DAILY, (Reported)


   Entered as Reported by: RITA AN on 1/7/20 1525


Tamsulosin HCl (Flomax) 0.4 Mg Cap, 0.4 MG PO DAILY, (Reported)


   Entered as Reported by: RITA AN on 1/7/20 1525





Review of Systems


Review of Systems


Constitutional:  no symptoms reported


EENTM:  No Symptoms Reported


Respiratory:  No Symptoms Reported


Cardiovascular:  No Symptoms Reported


Gastrointestinal:  Abdominal Pain


Genitourinary:  No Symptoms Reported


Musculoskeletal:  no symptoms reported


Skin:  no symptoms reported


Psychiatric/Neurological:  No Symptoms Reported


Endocrine:  No Symptoms Reported


Hematologic/Lymphatic:  No Symptoms Reported





Past Medical-Social-Family Hx


Immunizations Up To Date


Tetanus Booster (TDap):  More than 5yrs


PED Vaccines UTD:  No





Seasonal Allergies


Seasonal Allergies:  No





Past Medical History


Surgeries:  Yes (SEE BELOW)


Abdominal


Respiratory:  No


Cardiac:  Yes (A FIB--NO ANTICOAGULATION / 81 MG ASPIRIN ONLY. CHRONIC L LEG 

SWELLING )


Atrial Fibrillation, Chronic Edema/Swelling, Hypertension, Irregular Heartbeat


Neurological:  No


Reproductive Disorders:  No


Sexually Transmitted Disease:  No


HIV/AIDS:  No


Genitourinary:  Yes


Prostate Problems


Gastrointestinal:  Yes (CANCEROUS VS "PRECANCEROUS" COLON POLYPS-S/P COLON 

RESECTION 2003)


Diverticulosis, Polyps


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

LEG SWELLING)


Endocrine:  No


HEENT:  No


Loss of Vision:  Denies


Hearing Impairment:  Denies


Cancer:  Yes ("PRECANCEROUS COLON POLYPS" PER PT)


Colon


Did You Recieve Any Treatments:  Yes


What Type of Treatment Did You:  Surgical Intervention


Psychosocial:  No


Integumentary:  No


Blood Disorders:  No


Adverse Reaction/Blood Tranf:  No (N/A)





Family Medical History


Other Conditions/Hx





SOCIAL HISTORY: 


-RARE ETOH USE


-DENIES DRUG USE


-SMOKED 1 PPD, QUIT 1978





SURGICAL HISTORY:


-REMOVAL OF LEFT GROIN MASS 06/1993--LOW GRADE FIBROSARCOMA


-REMOVAL OF BENIGN ABDOMINAL WALL MASS 02/1995


-LOW ANTERIOR SIGMOID RESECTION 02/2003





-MULTIPLE COLONOSCOPIES/POLYPECTOMIES


-LAST COLONOSCOPY 02/2020--POLYPECTOMY X 3--DONE BY DR. TOLBERT.





Physical Exam


Vital Signs





Vital Signs - First Documented








 11/29/22





 22:55


 


Temp 36.4


 


Pulse 77


 


Resp 20


 


B/P (MAP) 139/80 (99)


 


Pulse Ox 98


 


O2 Delivery Room Air





Capillary Refill :


Height/Weight/BMI


Height: 6'1.00"


Weight: 213lbs. 0.0oz. [id number]; 30.37 BMI


Method:Stated


General Appearance:  mild distress


HEENT:  PERRL/EOMI


Neck:  non-tender, full range of motion, supple


Respiratory:  chest non-tender, lungs clear, normal breath sounds


Cardiovascular:  regular rate, rhythm, no edema


Gastrointestinal:  normal bowel sounds (Although distal), soft, tenderness 

(Generalized abdominal tenderness)


Extremities:  normal range of motion


Back:  normal inspection, no CVA tenderness


Neurologic/Psychiatric:  alert, normal mood/affect, oriented x 3


Skin:  normal color





Focused Exam


Lactate Level


11/29/22 23:27: Lactic Acid Level 1.75





Lactic Acid Level





Laboratory Tests








Test


 11/29/22


23:27


 


Lactic Acid Level


 1.75 MMOL/L


(0.50-2.00)











Progress/Results/Core Measures


Results/Orders


Lab Results





Laboratory Tests








Test


 11/29/22


23:01 11/29/22


23:27 11/30/22


00:02 Range/Units


 


 


White Blood Count


 7.1 


 


 


 4.3-11.0


10^3/uL


 


Red Blood Count


 4.73 


 


 


 4.30-5.52


10^6/uL


 


Hemoglobin 14.6    13.3-17.7  g/dL


 


Hematocrit 43    40-54  %


 


Mean Corpuscular Volume 91    80-99  fL


 


Mean Corpuscular Hemoglobin 31    25-34  pg


 


Mean Corpuscular Hemoglobin


Concent 34 


 


 


 32-36  g/dL





 


Red Cell Distribution Width 13.4    10.0-14.5  %


 


Platelet Count


 190 


 


 


 130-400


10^3/uL


 


Mean Platelet Volume 9.5    9.0-12.2  fL


 


Immature Granulocyte % (Auto) 1     %


 


Neutrophils (%) (Auto) 73    42-75  %


 


Lymphocytes (%) (Auto) 17    12-44  %


 


Monocytes (%) (Auto) 7    0-12  %


 


Eosinophils (%) (Auto) 2    0-10  %


 


Basophils (%) (Auto) 1    0-10  %


 


Neutrophils # (Auto)


 5.2 


 


 


 1.8-7.8


10^3/uL


 


Lymphocytes # (Auto)


 1.2 


 


 


 1.0-4.0


10^3/uL


 


Monocytes # (Auto)


 0.5 


 


 


 0.0-1.0


10^3/uL


 


Eosinophils # (Auto)


 0.1 


 


 


 0.0-0.3


10^3/uL


 


Basophils # (Auto)


 0.0 


 


 


 0.0-0.1


10^3/uL


 


Immature Granulocyte # (Auto)


 0.0 


 


 


 0.0-0.1


10^3/uL


 


Sodium Level 139    135-145  MMOL/L


 


Potassium Level 3.9    3.6-5.0  MMOL/L


 


Chloride Level 100      MMOL/L


 


Carbon Dioxide Level 25    21-32  MMOL/L


 


Anion Gap 14    5-14  MMOL/L


 


Blood Urea Nitrogen 15    7-18  MG/DL


 


Creatinine


 1.17 


 


 


 0.60-1.30


MG/DL


 


Estimat Glomerular Filtration


Rate 67 


 


 


  





 


BUN/Creatinine Ratio 13     


 


Glucose Level 114 H     MG/DL


 


Calcium Level 9.1    8.5-10.1  MG/DL


 


Corrected Calcium 8.9    8.5-10.1  MG/DL


 


Magnesium Level 2.2    1.6-2.4  MG/DL


 


Total Bilirubin 0.6    0.1-1.0  MG/DL


 


Aspartate Amino Transf


(AST/SGOT) 22 


 


 


 5-34  U/L





 


Alanine Aminotransferase


(ALT/SGPT) 29 


 


 


 0-55  U/L





 


Alkaline Phosphatase 77      U/L


 


Troponin I < 0.028    <0.028  NG/ML


 


Total Protein 7.0    6.4-8.2  GM/DL


 


Albumin 4.3    3.2-4.5  GM/DL


 


Lipase 25    8-78  U/L


 


Lactic Acid Level


 


 1.75 


 


 0.50-2.00


MMOL/L


 


Urine Color   YELLOW   


 


Urine Clarity   CLEAR   


 


Urine pH   7.5  5-9  


 


Urine Specific Gravity   1.015 L 1.016-1.022  


 


Urine Protein   TRACE H NEGATIVE  


 


Urine Glucose (UA)   NEGATIVE  NEGATIVE  


 


Urine Ketones   NEGATIVE  NEGATIVE  


 


Urine Nitrite   POSITIVE H NEGATIVE  


 


Urine Bilirubin   NEGATIVE  NEGATIVE  


 


Urine Urobilinogen   1.0  < = 1.0  MG/DL


 


Urine Leukocyte Esterase   1+ H NEGATIVE  


 


Urine RBC (Auto)   NEGATIVE  NEGATIVE  


 


Urine RBC   NONE   /HPF


 


Urine WBC   25-50 H  /HPF


 


Urine Crystals   NONE   /LPF


 


Urine Bacteria   LARGE H  /HPF


 


Urine Casts   NONE   /LPF


 


Urine Mucus   NEGATIVE   /LPF


 


Urine Culture Indicated   YES   








My Orders





Orders - ZAHRAA PRECIADO MD


Cbc With Automated Diff (11/29/22 23:17)


Comprehensive Metabolic Panel (11/29/22 23:17)


Lactic Acid Analyzer (11/29/22 23:17)


Lipase (11/29/22 23:17)


Magnesium (11/29/22 23:17)


Ua Culture If Indicated (11/29/22 23:17)


Troponin I Kleberg (11/29/22 23:17)


Ed Iv/Invasive Line Start (11/29/22 23:17)


Ns Iv 1000 Ml (Sodium Chloride 0.9%) (11/29/22 23:30)


Ct Abdomen/Pelvis W (11/29/22 23:18)


Iohexol Injection (Omnipaque 350 Mg/Ml 1 (11/30/22 00:30)


Received Contrast (Hold Metformin- Contr (11/30/22 00:30)


Ns (Ivpb) (Sodium Chloride 0.9% Ivpb Bag (11/30/22 00:30)


Urine Culture (11/30/22 00:02)


Fentanyl  Inj (Sublimaze Injection) (11/30/22 01:30)


Ceftriaxone 1 Gm Pre-Mix (Rocephin 1 Gm (11/30/22 01:31)


Ceftriaxone (Rocephin) (11/30/22 01:43)





Medications Given in ED





Current Medications








 Medications  Dose


 Ordered  Sig/Yasmin


 Route  Start Time


 Stop Time Status Last Admin


Dose Admin


 


 Fentanyl Citrate  50 mcg  ONCE  ONCE


 IVP  11/30/22 01:30


 11/30/22 01:31 DC 11/30/22 01:31


50 MCG


 


 Iohexol  100 ml  ONCE  ONCE


 IV  11/30/22 00:30


 11/30/22 00:32 DC 11/30/22 00:31


80 ML


 


 Sodium Chloride  100 ml  ONCE  ONCE


 IV  11/30/22 00:30


 11/30/22 00:32 DC 11/30/22 00:31


80 ML








Vital Signs/I&O











 11/29/22





 22:55


 


Temp 36.4


 


Pulse 77


 


Resp 20


 


B/P (MAP) 139/80 (99)


 


Pulse Ox 98


 


O2 Delivery Room Air











Progress


Progress Note :  


Progress Note


1. SMALL BOWEL OBSTRUCTION:


- CT ABD & PELVIS: see report


- CBC / CMP unremarkable, normal WBC


- Ceftriaxone 1gm iv STAT


- Fentanyl for pain


- Will admit for surgery consult in the morning. Discussed with hospitalist, Dr Wilcox and accepted


- NPO


- IVF


- NG tube


2. ACUTE CYSTITIS:


- UA is positive for nitrates, leukocyte esterase, WBC, bacteria


- Ceftriaxone covers for this


- NS IVF started in ER





Departure


Communication (Admissions)


Time/Spoke to Admitting Phy:  01:30


Discussed with Dr. Wilcox, hospitalist.  Will admit to observation





Impression





   Primary Impression:  


   Bowel obstruction


   Qualified Codes:  K56.600 - Partial intestinal obstruction, unspecified as to

   cause


   Additional Impression:  


   Acute cystitis without hematuria


Disposition:  30 STILL A PATIENT


Condition:  Stable





Admissions


Decision to Admit Reason:  Admit from ER (General)


Decision to Admit/Date:  Nov 29, 2022


Time/Decision to Admit Time:  23:45





Departure-Patient Inst.


Referrals:  


SUMI DUEÑAS MD (PCP/Family)


Primary Care Physician











ZAHRAA PRECIADO MD              Nov 29, 2022 23:01

## 2022-11-30 VITALS — DIASTOLIC BLOOD PRESSURE: 72 MMHG | SYSTOLIC BLOOD PRESSURE: 147 MMHG

## 2022-11-30 VITALS — DIASTOLIC BLOOD PRESSURE: 71 MMHG | SYSTOLIC BLOOD PRESSURE: 158 MMHG

## 2022-11-30 VITALS — SYSTOLIC BLOOD PRESSURE: 142 MMHG | DIASTOLIC BLOOD PRESSURE: 76 MMHG

## 2022-11-30 VITALS — DIASTOLIC BLOOD PRESSURE: 80 MMHG | SYSTOLIC BLOOD PRESSURE: 156 MMHG

## 2022-11-30 VITALS — SYSTOLIC BLOOD PRESSURE: 156 MMHG | DIASTOLIC BLOOD PRESSURE: 80 MMHG

## 2022-11-30 LAB
APTT PPP: YELLOW S
BACTERIA #/AREA URNS HPF: (no result) /HPF
BASOPHILS # BLD AUTO: 0 10^3/UL (ref 0–0.1)
BASOPHILS NFR BLD AUTO: 0 % (ref 0–10)
BILIRUB UR QL STRIP: NEGATIVE
BUN/CREAT SERPL: 14
CALCIUM SERPL-MCNC: 8.8 MG/DL (ref 8.5–10.1)
CHLORIDE SERPL-SCNC: 102 MMOL/L (ref 98–107)
CO2 SERPL-SCNC: 22 MMOL/L (ref 21–32)
CREAT SERPL-MCNC: 1.05 MG/DL (ref 0.6–1.3)
EOSINOPHIL # BLD AUTO: 0 10^3/UL (ref 0–0.3)
EOSINOPHIL NFR BLD AUTO: 0 % (ref 0–10)
EOSINOPHIL NFR BLD MANUAL: 1 %
FIBRINOGEN PPP-MCNC: CLEAR MG/DL
GFR SERPLBLD BASED ON 1.73 SQ M-ARVRAT: 77 ML/MIN
GLUCOSE SERPL-MCNC: 125 MG/DL (ref 70–105)
GLUCOSE UR STRIP-MCNC: NEGATIVE MG/DL
HCT VFR BLD CALC: 40 % (ref 40–54)
HGB BLD-MCNC: 13.3 G/DL (ref 13.3–17.7)
KETONES UR QL STRIP: NEGATIVE
LEUKOCYTE ESTERASE UR QL STRIP: (no result)
LYMPHOCYTES # BLD AUTO: 0.6 10^3/UL (ref 1–4)
LYMPHOCYTES NFR BLD AUTO: 7 % (ref 12–44)
MANUAL DIFFERENTIAL PERFORMED BLD QL: YES
MCH RBC QN AUTO: 31 PG (ref 25–34)
MCHC RBC AUTO-ENTMCNC: 34 G/DL (ref 32–36)
MCV RBC AUTO: 92 FL (ref 80–99)
MONOCYTES # BLD AUTO: 0.4 10^3/UL (ref 0–1)
MONOCYTES NFR BLD AUTO: 5 % (ref 0–12)
MONOCYTES NFR BLD: 8 %
NEUTROPHILS # BLD AUTO: 7.7 10^3/UL (ref 1.8–7.8)
NEUTROPHILS NFR BLD AUTO: 88 % (ref 42–75)
NEUTS BAND NFR BLD MANUAL: 85 %
NITRITE UR QL STRIP: POSITIVE
PH UR STRIP: 7.5 [PH] (ref 5–9)
PLATELET # BLD: 180 10^3/UL (ref 130–400)
PMV BLD AUTO: 9.6 FL (ref 9–12.2)
POTASSIUM SERPL-SCNC: 4.7 MMOL/L (ref 3.6–5)
PROT UR QL STRIP: (no result)
RBC #/AREA URNS HPF: (no result) /HPF
RBC MORPH BLD: NORMAL
SODIUM SERPL-SCNC: 137 MMOL/L (ref 135–145)
SP GR UR STRIP: 1.01 (ref 1.02–1.02)
VARIANT LYMPHS NFR BLD MANUAL: 6 %
WBC # BLD AUTO: 8.8 10^3/UL (ref 4.3–11)
WBC #/AREA URNS HPF: (no result) /HPF

## 2022-11-30 RX ADMIN — SODIUM CHLORIDE SCH MLS/HR: 900 INJECTION, SOLUTION INTRAVENOUS at 04:01

## 2022-11-30 RX ADMIN — SODIUM CHLORIDE SCH MLS/HR: 900 INJECTION, SOLUTION INTRAVENOUS at 09:52

## 2022-11-30 RX ADMIN — SODIUM CHLORIDE SCH MLS/HR: 900 INJECTION, SOLUTION INTRAVENOUS at 14:56

## 2022-11-30 NOTE — DISCHARGE INST-SURGICAL
D/C Lap Instructions-SALOME


Follow Up PRN





Activity as tolerated





Low residue diet for next 2 weeks.





Avoid Alcohol, Caffeine, Spicy Greasy and Acid foods.





Drink 64 fluid oz or more of fluids per day.





Symptoms to Report: Fever over 101 degree F, Nausea/Vomiting 


If any problems/questions: Contact your physician or go to Emergency Room











THANIA MCMAHON MD                Nov 30, 2022 15:42

## 2022-11-30 NOTE — DIAGNOSTIC IMAGING REPORT
PROCEDURE: CT abdomen and pelvis with contrast.



TECHNIQUE: Multiple contiguous axial images were obtained through

the abdomen and pelvis after administration of intravenous

contrast. Auto Exposure Controls were utilized during the CT exam

to meet ALARA standards for radiation dose reduction. All CT

scans use one or more of the following dose optimizing

techniques: automated exposure control, MA and/or KvP adjustment

based on patient size and exam type or iterative reconstruction.



INDICATION:  Abdominal pain and distention.



FINDINGS: There is a hepatic steatosis. Gallbladder and bile duct

are normal. Pancreas is normal. Spleen is not enlarged. The

adrenal glands are normal. There is a cyst off the left kidney in

the upper pole measuring 4 cm. There is no hydronephrosis or

calculi. Stomach is distended and filled with food and fluid.

Proximal small bowel is distended and fluid-filled. There is some

fecalization in the distal jejunum. The ileum is decompressed

consistent with partial small bowel obstruction. The colon shows

very little stool or gas. There is no free air or free fluid.

Bladder is nondistended. No pelvic masses. No adenopathy of

pathologic size. There is an anastomotic suture line in the

rectosigmoid colon. Lumbosacral spine shows good alignment

without evidence of compression fracture.



IMPRESSION:

1. Distended stomach and proximal small bowel with fecalization

of content in the distal jejunum consistent with chronic partial

small bowel obstruction. These findings are in agreement with the

preliminary report.



Dictated by: 



  Dictated on workstation # [id number]

## 2022-11-30 NOTE — DISCHARGE INST-SIMPLE/STANDARD
Discharge Inst-Standard


Discharge Medications


New, Converted or Re-Newed RX:  Transmitted to Pharmacy





Patient Instructions/Follow Up


Plan of Care/Instructions/FU:  


Please continue to take your medications as written. Please follow up with


your primary care doctor to follow up this hospital stay.


Activity as Tolerated:  Yes


Discharge Diet:  Soft Diet


Return to The Hospital For:  


Chest pain, elevated blood sugars, abdominal pain, nausea, vomiting,


shortness of breath, fever, weakness, if you feel you are getting worse.











DAILY PHAM MD              Nov 30, 2022 13:54

## 2022-11-30 NOTE — HISTORY & PHYSICAL-HOSPITALIST
History of Present Illness


HPI/Chief Complaint


Pt is a 67yoCM with a PMH of SBO and sigmoid colon resection, HTN, BPH, and a-

fib who presented to the ER due to sudden onset abdominal pain. He reported 

eating Thanksgiving left overs (6 days old) and then quickly developed colicky 

abdominal pain similar to his previous SBO prompting him to seek evaluation in 

the ER. CT abdomen/pelvis revealed partial SBO. He was admitted for further 

management. He was incidentally found to have UTI as well. This morning he 

reports already having a BM and pain has now resolved and he is doing well. He 

would like to advance his diet.


Source:  patient


Date Seen


22


Time Seen by a Provider:  10:05


Attending Physician


Antione Park MD


PCP


Admitting Physician:


Rodolfo Wilcox MD 








Attending Physician:


Rodolfo Wilcox MD


Referring Physician





Date of Admission


2022 at 02:21





Home Medications & Allergies


Home Medications


Reviewed patient Home Medication Reconciliation performed by pharmacy medication

reconciliations technician and/or nursing.


Patients Allergies have been reviewed.





Allergies





Allergies


Coded Allergies


  No Known Drug Allergies ([id number])








Past Medical-Social-Family Hx


Patient Social History


Tobacco Use?:  No


Smoking Status:  Former Smoker


Use of E-Cig and/or Vaping dev:  No


Substance use?:  No


Alcohol Use?:  Yes


Alcohol type:  Beer


Alcohol Frequency:  Once in a while


Pt feels they are or have been:  No





Immunizations Up To Date


Date of Influenza Vaccine:  Oct 10, 2022


Tetanus Booster (TDap):  Unknown


Hepatitis A:  No


Hepatitis B:  No


PED Vaccines UTD:  No





Seasonal Allergies


Seasonal Allergies:  No





Current Status


Advance Directives:  No


Communicates:  Verbally


Primary Language:  English


Preferred Spoken Language:  English


Is interpretation needed?:  No


Implanted or Applied Medical D:  None





Past Medical History


Surgeries:  Abdominal


Atrial Fibrillation, Chronic Edema/Swelling, Hypertension, Irregular Heartbeat


Sexually Transmitted Disease:  No


HIV/AIDS:  No


Prostate Problems


Diverticulosis, Polyps


Loss of Vision:  Denies


Hearing Impairment:  Denies


Colon


Did You Recieve Any Treatments:  Yes


What Type of Treatment Did You:  Surgical Intervention


Blood Disorders:  No


Adverse Reaction/Blood Tranf:  No (N/A)





Family Medical History


Reviewed Nursing Family Hx


Other Conditions/Hx





SOCIAL HISTORY: 


-RARE ETOH USE


-DENIES DRUG USE


-SMOKED 1 PPD, QUIT 





SURGICAL HISTORY:


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


-REMOVAL OF BENIGN ABDOMINAL WALL MASS 1995


-LOW ANTERIOR SIGMOID RESECTION 2003





-MULTIPLE COLONOSCOPIES/POLYPECTOMIES


-LAST COLONOSCOPY 2020--POLYPECTOMY X 3--DONE BY DR. TOLBERT.





Review of Systems


Constitutional:  No chills, No fever


EENTM:  no symptoms reported


Respiratory:  no symptoms reported


Cardiovascular:  no symptoms reported


Gastrointestinal:  see HPI


Genitourinary:  no symptoms reported


Musculoskeletal:  no symptoms reported


Skin:  no symptoms reported


Psychiatric/Neurological:  No Symptoms Reported





Physical Exam


Physical Exam


Vital Signs





Vital Signs - First Documented








 22





 22:55 04:04


 


Temp 36.4 


 


Pulse 77 


 


Resp 20 


 


B/P (MAP) 139/80 (99) 


 


Pulse Ox 98 


 


O2 Delivery Room Air 


 


FiO2  21





Capillary Refill : Less Than 3 Seconds


Height, Weight, BMI


Height: 6'1.00"


Weight: 213lbs. 0.0oz. 96.923449zo; 30.61 BMI


Method:Stated


General Appearance:  No Apparent Distress, WD/WN


HEENT:  PERRL/EOMI, Moist Mucous Membranes; No Scleral Icterus (L), No Scleral 

Icterus (R)


Neck:  Normal Inspection, Supple


Respiratory:  Lungs Clear, No Accessory Muscle Use, No Respiratory Distress


Cardiovascular:  Regular Rate, Rhythm, No JVD, No Murmur


Gastrointestinal:  Normal Bowel Sounds, Non Tender, Soft


Neurologic/Psychiatric:  Alert, Oriented x3


Skin:  Normal Color, Warm/Dry





Results


Results/Procedures


Labs


Laboratory Tests


22 23:01








22 05:57








Patient resulted labs reviewed.


Imaging:  Reviewed Imaging Report


Imaging


                 [facility] [place], [place]





NAME:   RODRIGO REYNOSO


MED REC#:   [id number]


ACCOUNT#:   [id number]


PT STATUS:   ADM Evan


:   1955


PHYSICIAN:   ZAHRAA PRECIADO MD


ADMIT DATE:   22/


                                  ***Signed***


Date of Exam:22





CT ABDOMEN/PELVIS W








PROCEDURE: CT abdomen and pelvis with contrast.





TECHNIQUE: Multiple contiguous axial images were obtained through


the abdomen and pelvis after administration of intravenous


contrast. Auto Exposure Controls were utilized during the CT exam


to meet ALARA standards for radiation dose reduction. All CT


scans use one or more of the following dose optimizing


techniques: automated exposure control, MA and/or KvP adjustment


based on patient size and exam type or iterative reconstruction.





INDICATION:  Abdominal pain and distention.





FINDINGS: There is a hepatic steatosis. Gallbladder and bile duct


are normal. Pancreas is normal. Spleen is not enlarged. The


adrenal glands are normal. There is a cyst off the left kidney in


the upper pole measuring 4 cm. There is no hydronephrosis or


calculi. Stomach is distended and filled with food and fluid.


Proximal small bowel is distended and fluid-filled. There is some


fecalization in the distal jejunum. The ileum is decompressed


consistent with partial small bowel obstruction. The colon shows


very little stool or gas. There is no free air or free fluid.


Bladder is nondistended. No pelvic masses. No adenopathy of


pathologic size. There is an anastomotic suture line in the


rectosigmoid colon. Lumbosacral spine shows good alignment


without evidence of compression fracture.





IMPRESSION:


1. Distended stomach and proximal small bowel with fecalization


of content in the distal jejunum consistent with chronic partial


small bowel obstruction. These findings are in agreement with the


preliminary report.





Dictated by: 





  Dictated on workstation # [id number]








Dict:   [id number]


Trans:   22


[facility] [id number]





Interpreted by:     PRABHA CARLSON MD


Electronically signed by: PRABHA CARLSON MD 22





Assessment/Plan


Admission Diagnosis


partial SBO


Admission Status:  Observation





Assessment and Plan


partial SBO


   Now resolved


   had BM this AM


   Advance diet


   Surgery consulted, appreciate recs


   May DC home this evening if doing well





UTI


   Continue Rocephin





HTN


BPH


   Continue home meds as appropriate





DVT ppx: SCDs





Diagnosis/Problems


Diagnosis/Problems





(1) Bowel obstruction


Status:  Acute


Qualifiers:  


   Intestinal obstruction type:  unspecified  Intestinal obstruction extent:  

partial  Qualified Codes:  K56.600 - Partial intestinal obstruction, unspecified

as to cause


(2) UTI (urinary tract infection)


Qualifiers:  


   Urinary tract infection type:  acute cystitis  Hematuria presence:  without 

hematuria  Qualified Codes:  N30.00 - Acute cystitis without hematuria


(3) HTN (hypertension)


Status:  Chronic


Qualifiers:  


   Hypertension type:  primary hypertension  Qualified Codes:  I10 - Essential 

(primary) hypertension


(4) BPH associated with nocturia


Status:  DAILY Chavarria MD             2022 10:06 am

## 2022-11-30 NOTE — CONSULTATION REPORT
ADMITTING PHYSICIAN:  Dr. Paige Segovia.



ATTENDING PRIMARY CARE PHYSICIAN:  Dr. Antione Park.



HISTORY OF PRESENT ILLNESS:  The patient is a 67-year-old male who was admitted 

for abdominal distention, crampy abdominal pain as well as intermittent episodes

of nausea; however, no vomiting.  He has had a history of small bowel 

obstructions, which have resolved on their own over time.  He underwent a low 

anterior sigmoid colorectal resection due to diverticular disease in the past.  

He also does have a number of other medical comorbidities including hypertension

and atrial fibrillation.  Since being admitted and placed on bowel rest, IV 

fluids and early ambulation, he has been able to have a bowel movement with less

crampy abdominal pain and less abdominal distention.  The initial CT scan upon 

admission did show some dilated stomach as well as some mild dilatation of the 

small bowel, likely consistent with a partial small-bowel obstruction.  Again, 

since being admitted, he has had a bowel movement and is tolerating a low 

residue diet without any difficulty.



PAST MEDICAL HISTORY:  Symptomatic diverticulosis and diverticulitis, atrial 

fibrillation, bilateral lower extremity edema, hypertension, benign prostatic 

hypertrophy, left groin low-grade fibrosarcoma.



PAST SURGICAL HISTORY:  Excision low-grade fibrosarcoma left groin 1993, 

excision benign abdominal wall mass 2/1995, low anterior colorectal resection 

02/2003.



ALLERGIES:  NO KNOWN DRUG ALLERGIES.



MEDICATIONS:  Aspirin 81 mg daily, cephalexin 500 mg b.i.d., diltiazem 240 mg 

daily, lisinopril 10 mg daily, MiraLax 17 grams daily, tamsulosin 0.4 mg daily.



SOCIAL HISTORY:  Previous smoke, quit 1978.  Does not report any drinking 

alcohol.



VITAL SIGNS:  Temperature 36.2, blood pressure 158/71, pulse 61, respirations 

18, pulse ox 99% on room air.



REVIEW OF SYSTEMS:  Well-nourished male, currently in no acute distress.  He is 

not experiencing any shortness of breath or difficulty breathing.  No chest 

pain, palpitations, diaphoresis.  No nausea, vomiting.  No abdominal distention 

or crampy pain.  He is currently tolerating clear liquids, has had a bowel 

movement.  No red blood per rectum, no dark tarry stools.  No nausea, vomiting. 

No hematemesis, no coffee-ground emesis.  No fever or chills.  No recent 

inadvertent weight loss.  All other review of systems negative.



PHYSICAL EXAMINATION:

LUNGS:  Few scattered rales bilaterally.

HEART:  Regular, no murmurs.

EXTREMITIES:  No lower extremity edema.  Negative Homans sign.

HEENT:  No scleral icterus.  No cervical lymphadenopathy.

ABDOMEN:  Soft, nondistended.  There is mild discomfort upon deep palpation.  No

peritoneal signs.  No hernias.

SKIN:  Warm and dry.



LABORATORY DATA:  WBC 8.8, hemoglobin 13.3, hematocrit 40, platelets 180, BUN 

15, creatinine 1.05.  Liver function enzymes normal.  Urinalysis, positive 

nitrite as well as leukocyte esterase.



ASSESSMENT AND PLAN:  A 67-year-old male with a partial small-bowel obstruction,

which has resolved with conservative management with IV fluids and bowel rest.  

This may have been initiated by urinary tract infection and we will advance his 

diet to a low residue diet and if he is able to tolerate this, he may be 

discharged home today.  We will also recommend him to continue the low residue 

diet for the next two weeks.





Job ID: [id number]

DocumentID: [id number]

Dictated Date: 11/30/2022 15:40:48

Transcription Date: 11/30/2022 16:16:00

Dictated By: THANIA MCMAHON MD

## 2023-03-28 ENCOUNTER — HOSPITAL ENCOUNTER (OUTPATIENT)
Dept: HOSPITAL 75 - CARD | Age: 68
End: 2023-03-28
Attending: INTERNAL MEDICINE
Payer: COMMERCIAL

## 2023-03-28 DIAGNOSIS — I35.1: ICD-10-CM

## 2023-03-28 DIAGNOSIS — R93.1: Primary | ICD-10-CM

## 2023-03-28 DIAGNOSIS — I51.7: ICD-10-CM

## 2023-03-28 PROCEDURE — 93306 TTE W/DOPPLER COMPLETE: CPT

## 2023-04-11 ENCOUNTER — HOSPITAL ENCOUNTER (OUTPATIENT)
Dept: HOSPITAL 75 - CARD | Age: 68
End: 2023-04-11
Attending: NURSE PRACTITIONER
Payer: COMMERCIAL

## 2023-04-11 VITALS — SYSTOLIC BLOOD PRESSURE: 151 MMHG | DIASTOLIC BLOOD PRESSURE: 81 MMHG

## 2023-04-11 DIAGNOSIS — R93.1: Primary | ICD-10-CM

## 2023-04-11 PROCEDURE — 78452 HT MUSCLE IMAGE SPECT MULT: CPT

## 2023-04-11 PROCEDURE — 93017 CV STRESS TEST TRACING ONLY: CPT

## 2023-04-11 NOTE — STRESS TEST
DATE OF SERVICE: 04/11/2023



RESTING AND POST REGADENOSON TECHNETIUM-99M TETROFOSMIN SPECT CT IMAGING



ORDERING PHYSICIAN:

LARON Lane.



PRIMARY PHYSICIAN:

Dr. Antione Park.



CLINICAL DIAGNOSIS:

Abnormal electrocardiogram.



Baseline images were carried out after injection of 10.95 mCi of technetium-99m 

tetrofosmin.  This was followed by 0.4 mg regadenoson and 30.7 mCi of 

technetium-99m tetrofosmin for stress imaging.  The electrocardiogram showed 

sinus rhythm with right bundle branch block.  It did not change significantly 

with the regadenoson infusion.  The patient noted dizziness after regadenoson, 

which resolved in a few minutes.  Review of images at rest and following stress 

indicates a small basal inferior perfusion defect, which appears transient.  

Gated images show normal global left ventricular systolic function with normal 

regional wall motion.  Left ventricular ejection fraction is calculated to be 

56%.



CONCLUSIONS:

1.  This study is suggestive of a small amount of basal inferior ischemia (SDS 
4).

2.  Normal regional wall motion.

3.  Normal global left ventricular systolic function with a calculated ejection 

fraction of 56%.





Job ID: [id number]

DocumentID: [id number]

Dictated Date: 04/11/2023 12:20:44

Transcription Date: 04/11/2023 12:48:00

Dictated By: KATYA PERALTA MD; MA; MICHELLP; MICHELLC;

EDYTA